# Patient Record
Sex: FEMALE | Race: WHITE | NOT HISPANIC OR LATINO | URBAN - METROPOLITAN AREA
[De-identification: names, ages, dates, MRNs, and addresses within clinical notes are randomized per-mention and may not be internally consistent; named-entity substitution may affect disease eponyms.]

---

## 2017-08-01 ENCOUNTER — OUTPATIENT (OUTPATIENT)
Dept: OUTPATIENT SERVICES | Facility: HOSPITAL | Age: 42
LOS: 1 days | End: 2017-08-01
Payer: MEDICAID

## 2017-08-02 DIAGNOSIS — R69 ILLNESS, UNSPECIFIED: ICD-10-CM

## 2017-08-11 ENCOUNTER — EMERGENCY (EMERGENCY)
Facility: HOSPITAL | Age: 42
LOS: 1 days | Discharge: HOME | End: 2017-08-11
Attending: EMERGENCY MEDICINE
Payer: MEDICAID

## 2017-08-11 VITALS
HEART RATE: 115 BPM | HEIGHT: 63 IN | DIASTOLIC BLOOD PRESSURE: 84 MMHG | RESPIRATION RATE: 18 BRPM | WEIGHT: 164.91 LBS | TEMPERATURE: 98 F | OXYGEN SATURATION: 97 % | SYSTOLIC BLOOD PRESSURE: 124 MMHG

## 2017-08-11 PROCEDURE — 99283 EMERGENCY DEPT VISIT LOW MDM: CPT

## 2017-08-11 PROCEDURE — 99283 EMERGENCY DEPT VISIT LOW MDM: CPT | Mod: 25

## 2017-08-11 RX ORDER — IBUPROFEN 200 MG
600 TABLET ORAL ONCE
Qty: 0 | Refills: 0 | Status: COMPLETED | OUTPATIENT
Start: 2017-08-11 | End: 2017-08-11

## 2017-08-11 RX ORDER — IBUPROFEN 200 MG
1 TABLET ORAL
Qty: 20 | Refills: 0 | OUTPATIENT
Start: 2017-08-11 | End: 2017-08-16

## 2017-08-11 RX ADMIN — Medication 600 MILLIGRAM(S): at 05:02

## 2017-08-11 NOTE — ED ADULT NURSE NOTE - OBJECTIVE STATEMENT
pt A&Ox4, c/o left buttock pain states had a injection at the doctors and afterward area became very painful and red

## 2017-08-11 NOTE — ED ADULT TRIAGE NOTE - CHIEF COMPLAINT QUOTE
I am on Vivtrol shot for alcohol on Tuesday. and area is painful, red and tender. to left butt cheek.

## 2017-08-16 NOTE — ED PROVIDER NOTE - PHYSICAL EXAMINATION
Vitals:   ·  /84 mm Hg·  /min· RR: (breaths/min)18 /min· Temp 98 Degrees F· SpO2 97 % RA  Left gluteal examination : No redness, No hematoma , No warmth . Area slightly firm and tender to touch.

## 2017-08-16 NOTE — ED PROVIDER NOTE - ATTENDING CONTRIBUTION TO CARE
Patient with left gluteal pain after medication injection.  Tenderness and swelling noted; no hematoma.  She was given a heat pack and pain medication and also instructed to alternate injection sites.

## 2017-08-16 NOTE — ED PROVIDER NOTE - OBJECTIVE STATEMENT
42 yr old F presented to ED for pain to left gluteal region. Pt states that she had a Vivitrol injection x 3 days ago and the area has been painful. Pt say that she gets the injection to control her Alcohol addiction. Pt denies any numbness or weakness to her lower extremity or inability to ambulate. Pt says it just hurts a lot so she want to be evaluated because this never happened before. Pt denies nay other complaints at this time.

## 2017-08-16 NOTE — ED PROVIDER NOTE - PROGRESS NOTE DETAILS
Pt treated wit Ibuprofen 600mg Po x 1 and warm compress applied to area. Pt tolerated treatment and felt a lot better. Pt D/C in stable condition and will F/U with Medical clinic as discussed.

## 2017-08-16 NOTE — ED PROVIDER NOTE - CARE PLAN
Principal Discharge DX:	Musculoskeletal pain  Instructions for follow-up, activity and diet:	Continue with warm packs as discussed and Ibuprofen as needed

## 2017-08-16 NOTE — ED PROVIDER NOTE - MEDICAL DECISION MAKING DETAILS
41 y/o F presented to ED with left gluteal muscle pain s/p IM medication x 3 days ago. Pt with no fever, chills , numbness to leg or weakness . localized pain on examination of gluteal region but no discoloration or hematoma.

## 2017-10-01 PROCEDURE — G9001: CPT

## 2017-10-30 ENCOUNTER — EMERGENCY (EMERGENCY)
Facility: HOSPITAL | Age: 42
LOS: 1 days | Discharge: OTHER INSTITUTION | End: 2017-10-30
Attending: EMERGENCY MEDICINE
Payer: MEDICAID

## 2017-10-30 VITALS
DIASTOLIC BLOOD PRESSURE: 99 MMHG | SYSTOLIC BLOOD PRESSURE: 162 MMHG | TEMPERATURE: 98 F | HEART RATE: 107 BPM | OXYGEN SATURATION: 96 % | RESPIRATION RATE: 18 BRPM

## 2017-10-30 LAB
ALBUMIN SERPL ELPH-MCNC: 4.4 G/DL — SIGNIFICANT CHANGE UP (ref 3.3–5.2)
ALP SERPL-CCNC: 104 U/L — SIGNIFICANT CHANGE UP (ref 40–120)
ALT FLD-CCNC: 84 U/L — HIGH
AMPHET UR-MCNC: NEGATIVE — SIGNIFICANT CHANGE UP
ANION GAP SERPL CALC-SCNC: 23 MMOL/L — HIGH (ref 5–17)
APPEARANCE UR: CLEAR — SIGNIFICANT CHANGE UP
AST SERPL-CCNC: 140 U/L — HIGH
BACTERIA # UR AUTO: ABNORMAL
BARBITURATES UR SCN-MCNC: NEGATIVE — SIGNIFICANT CHANGE UP
BASOPHILS # BLD AUTO: 0 K/UL — SIGNIFICANT CHANGE UP (ref 0–0.2)
BASOPHILS NFR BLD AUTO: 0.7 % — SIGNIFICANT CHANGE UP (ref 0–2)
BENZODIAZ UR-MCNC: NEGATIVE — SIGNIFICANT CHANGE UP
BILIRUB SERPL-MCNC: 0.4 MG/DL — SIGNIFICANT CHANGE UP (ref 0.4–2)
BILIRUB UR-MCNC: NEGATIVE — SIGNIFICANT CHANGE UP
BUN SERPL-MCNC: 4 MG/DL — LOW (ref 8–20)
CALCIUM SERPL-MCNC: 8.7 MG/DL — SIGNIFICANT CHANGE UP (ref 8.6–10.2)
CHLORIDE SERPL-SCNC: 95 MMOL/L — LOW (ref 98–107)
CO2 SERPL-SCNC: 21 MMOL/L — LOW (ref 22–29)
COCAINE METAB.OTHER UR-MCNC: NEGATIVE — SIGNIFICANT CHANGE UP
COLOR SPEC: ABNORMAL
CREAT SERPL-MCNC: 0.52 MG/DL — SIGNIFICANT CHANGE UP (ref 0.5–1.3)
DIFF PNL FLD: ABNORMAL
EOSINOPHIL # BLD AUTO: 0.1 K/UL — SIGNIFICANT CHANGE UP (ref 0–0.5)
EOSINOPHIL NFR BLD AUTO: 0.9 % — SIGNIFICANT CHANGE UP (ref 0–6)
EPI CELLS # UR: SIGNIFICANT CHANGE UP
ETHANOL SERPL-MCNC: 264 MG/DL — SIGNIFICANT CHANGE UP
GLUCOSE SERPL-MCNC: 87 MG/DL — SIGNIFICANT CHANGE UP (ref 70–115)
GLUCOSE UR QL: NEGATIVE MG/DL — SIGNIFICANT CHANGE UP
HCT VFR BLD CALC: 39.3 % — SIGNIFICANT CHANGE UP (ref 37–47)
HGB BLD-MCNC: 13.9 G/DL — SIGNIFICANT CHANGE UP (ref 12–16)
KETONES UR-MCNC: NEGATIVE — SIGNIFICANT CHANGE UP
LEUKOCYTE ESTERASE UR-ACNC: NEGATIVE — SIGNIFICANT CHANGE UP
LYMPHOCYTES # BLD AUTO: 1.5 K/UL — SIGNIFICANT CHANGE UP (ref 1–4.8)
LYMPHOCYTES # BLD AUTO: 22.5 % — SIGNIFICANT CHANGE UP (ref 20–55)
MCHC RBC-ENTMCNC: 34.7 PG — HIGH (ref 27–31)
MCHC RBC-ENTMCNC: 35.4 G/DL — SIGNIFICANT CHANGE UP (ref 32–36)
MCV RBC AUTO: 98 FL — SIGNIFICANT CHANGE UP (ref 81–99)
METHADONE UR-MCNC: NEGATIVE — SIGNIFICANT CHANGE UP
MONOCYTES # BLD AUTO: 0.6 K/UL — SIGNIFICANT CHANGE UP (ref 0–0.8)
MONOCYTES NFR BLD AUTO: 8.6 % — SIGNIFICANT CHANGE UP (ref 3–10)
NEUTROPHILS # BLD AUTO: 4.5 K/UL — SIGNIFICANT CHANGE UP (ref 1.8–8)
NEUTROPHILS NFR BLD AUTO: 66.9 % — SIGNIFICANT CHANGE UP (ref 37–73)
NITRITE UR-MCNC: NEGATIVE — SIGNIFICANT CHANGE UP
OPIATES UR-MCNC: NEGATIVE — SIGNIFICANT CHANGE UP
PCP SPEC-MCNC: SIGNIFICANT CHANGE UP
PCP UR-MCNC: NEGATIVE — SIGNIFICANT CHANGE UP
PH UR: 7 — SIGNIFICANT CHANGE UP (ref 5–8)
PLATELET # BLD AUTO: 100 K/UL — LOW (ref 150–400)
POTASSIUM SERPL-MCNC: 4.4 MMOL/L — SIGNIFICANT CHANGE UP (ref 3.5–5.3)
POTASSIUM SERPL-SCNC: 4.4 MMOL/L — SIGNIFICANT CHANGE UP (ref 3.5–5.3)
PROT SERPL-MCNC: 7.9 G/DL — SIGNIFICANT CHANGE UP (ref 6.6–8.7)
PROT UR-MCNC: 15 MG/DL
RBC # BLD: 4.01 M/UL — LOW (ref 4.4–5.2)
RBC # FLD: 18.3 % — HIGH (ref 11–15.6)
RBC CASTS # UR COMP ASSIST: SIGNIFICANT CHANGE UP /HPF (ref 0–4)
SODIUM SERPL-SCNC: 139 MMOL/L — SIGNIFICANT CHANGE UP (ref 135–145)
SP GR SPEC: 1 — LOW (ref 1.01–1.02)
THC UR QL: NEGATIVE — SIGNIFICANT CHANGE UP
UROBILINOGEN FLD QL: NEGATIVE MG/DL — SIGNIFICANT CHANGE UP
WBC # BLD: 6.7 K/UL — SIGNIFICANT CHANGE UP (ref 4.8–10.8)
WBC # FLD AUTO: 6.7 K/UL — SIGNIFICANT CHANGE UP (ref 4.8–10.8)
WBC UR QL: SIGNIFICANT CHANGE UP

## 2017-10-30 PROCEDURE — 99285 EMERGENCY DEPT VISIT HI MDM: CPT

## 2017-10-30 PROCEDURE — 93010 ELECTROCARDIOGRAM REPORT: CPT

## 2017-10-30 RX ORDER — ONDANSETRON 8 MG/1
8 TABLET, FILM COATED ORAL ONCE
Qty: 0 | Refills: 0 | Status: COMPLETED | OUTPATIENT
Start: 2017-10-30 | End: 2017-10-30

## 2017-10-30 RX ORDER — SODIUM CHLORIDE 9 MG/ML
1000 INJECTION INTRAMUSCULAR; INTRAVENOUS; SUBCUTANEOUS ONCE
Qty: 0 | Refills: 0 | Status: COMPLETED | OUTPATIENT
Start: 2017-10-30 | End: 2017-10-30

## 2017-10-30 RX ORDER — NICOTINE POLACRILEX 2 MG
1 GUM BUCCAL DAILY
Qty: 0 | Refills: 0 | Status: DISCONTINUED | OUTPATIENT
Start: 2017-10-30 | End: 2017-11-11

## 2017-10-30 RX ADMIN — Medication 50 MILLIGRAM(S): at 19:46

## 2017-10-30 RX ADMIN — Medication 1 MILLIGRAM(S): at 22:05

## 2017-10-30 RX ADMIN — Medication 1 PATCH: at 18:00

## 2017-10-30 RX ADMIN — Medication 50 MILLIGRAM(S): at 16:28

## 2017-10-30 RX ADMIN — ONDANSETRON 8 MILLIGRAM(S): 8 TABLET, FILM COATED ORAL at 16:30

## 2017-10-30 NOTE — ED PROVIDER NOTE - OBJECTIVE STATEMENT
43 yo male BIB EMS from [facility] for possible seizure; patient had court date for DWI, while in court, was eating during break, felt weak, lightheaded, minimal headache, then can't recall event thereafter; reportedly collapsed to floor with some small convulsions; was confused until arrived in ED; on arrival, denied any chronic alcohol or other drug use; however noted to be tremulous, and elevated LFTs. When confronted about these details, patient then admits to regular alcohol use, last drink 3 days ago; also with chronic use of xanax, has been without xanax for 6 days. [age] yo female longstanding hx of alcohol abuse and occasional cocaine use; presents with "I need detox." Patient has been drinking heavily for several days, and now wants to stop; patient states she has tried rehab before without success; no chest pain, no SOB.
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
## 2017-10-30 NOTE — ED PROVIDER NOTE - CONSTITUTIONAL, MLM
normal... non toxic, tremulous Well appearing, well nourished, awake, alert, oriented to person, place, time/situation and in no apparent distress. +anxious, +aroma of alcohol on breath

## 2017-10-30 NOTE — ED STATDOCS - PROGRESS NOTE DETAILS
41 yo F presents to ED c/o "I need detox". Pt admits to alcohol abuse and cocaine use. Last drink was today PTA. Last cocaine use 3 days ago.  Friend at bedside states pt voiced suicidal ideations today PTA which prompted ED visit. Last detox 1 month ago. Last rehab in June. Associated -30lb weight loss in 1 month and poor appetite. Adverse reaction Vivitrol. PE: Pupils are 7mm, reactive equal, round, and reactive to light. No scleral icterus. Bilateral horizontal nystagmus. White coating on surface on tongue. No cervical adenopathy. Moist mucous membranes. No organomegaly. No abdominal tenderness. No jaundice. Focused eval, protocol orders entered. Pt to be moved to main ED for complete evaluation by another provider.

## 2017-10-30 NOTE — ED PROVIDER NOTE - PSYCHIATRIC, MLM
Alert and oriented to person, place, time/situation. normal mood and affect. no apparent risk to self or others. +Etoh abuse
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
## 2017-10-30 NOTE — ED PROVIDER NOTE - PROGRESS NOTE DETAILS
SBIRT and Social Work contacted, will assess for detox bed accepted by Dr Coleman, [facility]; will transfer

## 2017-10-30 NOTE — ED ADULT NURSE NOTE - CHPI ED SYMPTOMS NEG
no confusion/no abdominal pain/no chills/no weakness/no pain/no nausea/no abdominal distension/no fever/no vomiting/no disorientation

## 2017-10-31 VITALS
DIASTOLIC BLOOD PRESSURE: 95 MMHG | RESPIRATION RATE: 18 BRPM | SYSTOLIC BLOOD PRESSURE: 136 MMHG | OXYGEN SATURATION: 98 % | TEMPERATURE: 97 F | HEART RATE: 96 BPM

## 2017-10-31 PROCEDURE — 93005 ELECTROCARDIOGRAM TRACING: CPT

## 2017-10-31 PROCEDURE — 80307 DRUG TEST PRSMV CHEM ANLYZR: CPT

## 2017-10-31 PROCEDURE — 96372 THER/PROPH/DIAG INJ SC/IM: CPT

## 2017-10-31 PROCEDURE — 36415 COLL VENOUS BLD VENIPUNCTURE: CPT

## 2017-10-31 PROCEDURE — 85027 COMPLETE CBC AUTOMATED: CPT

## 2017-10-31 PROCEDURE — 80053 COMPREHEN METABOLIC PANEL: CPT

## 2017-10-31 PROCEDURE — 81001 URINALYSIS AUTO W/SCOPE: CPT

## 2017-10-31 PROCEDURE — 99285 EMERGENCY DEPT VISIT HI MDM: CPT | Mod: 25

## 2017-10-31 RX ORDER — ONDANSETRON 8 MG/1
4 TABLET, FILM COATED ORAL ONCE
Qty: 0 | Refills: 0 | Status: COMPLETED | OUTPATIENT
Start: 2017-10-31 | End: 2017-10-31

## 2017-10-31 RX ORDER — ONDANSETRON 8 MG/1
4 TABLET, FILM COATED ORAL ONCE
Qty: 0 | Refills: 0 | Status: DISCONTINUED | OUTPATIENT
Start: 2017-10-31 | End: 2017-10-31

## 2017-10-31 RX ORDER — NICOTINE POLACRILEX 2 MG
1 GUM BUCCAL DAILY
Qty: 0 | Refills: 0 | Status: DISCONTINUED | OUTPATIENT
Start: 2017-10-31 | End: 2017-10-31

## 2017-10-31 RX ADMIN — Medication 1 MILLIGRAM(S): at 01:31

## 2017-10-31 RX ADMIN — Medication 1 PATCH: at 08:39

## 2017-10-31 RX ADMIN — ONDANSETRON 4 MILLIGRAM(S): 8 TABLET, FILM COATED ORAL at 00:51

## 2017-10-31 RX ADMIN — Medication 100 MILLIGRAM(S): at 06:51

## 2017-10-31 RX ADMIN — Medication 2 MILLIGRAM(S): at 10:40

## 2017-10-31 NOTE — SBIRT NOTE. - NSSBIRTSERVICES_GEN_A_ED_FT
Naloxone Rescue Kit dispensed: Pt was educated about Naloxone and trained on how to assemble and utilize the kit.[id number] and [id number] to friend  Provided SBIRT services: Full screen positive. Referral to Treatment Performed. Screening results were reviewed with the patient and patient was provided information about healthy guidelines and potential negative consequences associated with level of risk. Motivation and readiness to reduce or stop use was discussed and goals and activities to make changes were suggested/offered.  Referral for complete assessment and level of care determination at a certified treatment facility was completed by contacting the treatment facility via phone, and add apt info as noted below:  Appt confirmed at [facility] - Doc to doc - Nurse to nurse - Peter via   Audit Score: 35  DAST Score: 2  Duration = 50 Minutes

## 2017-10-31 NOTE — ED ADULT NURSE REASSESSMENT NOTE - TREMOR
4=moderate with patient's arms extended
0=no tremor

## 2017-10-31 NOTE — CHART NOTE - NSCHARTNOTEFT_GEN_A_CORE
SOCIAL WORK NOTE:  DISCUSSED CASE WITH JYOTHI FROM SBIRT.  MULTIPLE PHONE SCREENINGS COMPLETED AND BED AVAILABLE AT [facility]. DR RIVERO CALLED ACCEPTING MD, DR AHMADI.  PATIENT ACCEPTED TO [facility] TODAY. CONFIRMED WITH DR RIVERO. AMBULANCE ARRANGED FOR 12:30 -1:00 PM PICKUP AND TRANSFER FOR TODAY.

## 2017-10-31 NOTE — ED ADULT NURSE REASSESSMENT NOTE - NS ED NURSE REASSESS COMMENT FT1
nurse to nurse report given to KAYCE godinez
Pt resting comfortable in NAD resp even and unlabored 1:1 in place will continue to monitor.
Assumed pt care @ 1915 from ADRIENNE Heller. Pt is A&Ox3 in NAD. Pt in yellow gown with belongings off the stretcher with 1:1 in place. Pt denies any pain. CIWA 5.  Safety maintained, Bed locked in lowest position. Will continue to monitor.
Dr. Sherman made aware of the pts CIWA. Will continue to monitor.
Pt aware she is waiting for SW this morning. Librium administered.
patient aox4 comfortable in appearance. respirations clear equal and unlabored. heart sounds s1 s2  WDL, abdomen soft nontender + bowel sounds all 4 quadrants, moves all extremities. + pulse all 4 extremities. + sensation all 4 extremities. patient and family updated on plan of care. patient and family educated on hourly rounding procedures and understands call bell system. 1:1 shola at bedside.

## 2017-10-31 NOTE — ED ADULT NURSE REASSESSMENT NOTE - NAUSEA/VOMITING
0=no nausea with no vomiting
0=no nausea with no vomiting
1=mild nausea with no vomiting
0=no nausea with no vomiting

## 2017-11-05 ENCOUNTER — EMERGENCY (EMERGENCY)
Facility: HOSPITAL | Age: 42
LOS: 1 days | Discharge: HOME | End: 2017-11-05
Attending: EMERGENCY MEDICINE | Admitting: STUDENT IN AN ORGANIZED HEALTH CARE EDUCATION/TRAINING PROGRAM
Payer: MEDICAID

## 2017-11-05 VITALS
OXYGEN SATURATION: 97 % | TEMPERATURE: 98 F | RESPIRATION RATE: 16 BRPM | DIASTOLIC BLOOD PRESSURE: 76 MMHG | HEART RATE: 91 BPM | WEIGHT: 139.99 LBS | SYSTOLIC BLOOD PRESSURE: 106 MMHG | HEIGHT: 63 IN

## 2017-11-05 LAB
ALBUMIN SERPL ELPH-MCNC: 4.2 G/DL — SIGNIFICANT CHANGE UP (ref 3.3–5.2)
ALP SERPL-CCNC: 82 U/L — SIGNIFICANT CHANGE UP (ref 40–120)
ALT FLD-CCNC: 168 U/L — HIGH
AMPHET UR-MCNC: NEGATIVE — SIGNIFICANT CHANGE UP
ANION GAP SERPL CALC-SCNC: 14 MMOL/L — SIGNIFICANT CHANGE UP (ref 5–17)
APAP SERPL-MCNC: <7.5 UG/ML — LOW (ref 10–26)
APPEARANCE UR: CLEAR — SIGNIFICANT CHANGE UP
AST SERPL-CCNC: 156 U/L — HIGH
BARBITURATES UR SCN-MCNC: NEGATIVE — SIGNIFICANT CHANGE UP
BASOPHILS # BLD AUTO: 0.1 K/UL — SIGNIFICANT CHANGE UP (ref 0–0.2)
BASOPHILS NFR BLD AUTO: 1.1 % — SIGNIFICANT CHANGE UP (ref 0–2)
BENZODIAZ UR-MCNC: NEGATIVE — SIGNIFICANT CHANGE UP
BILIRUB SERPL-MCNC: 0.2 MG/DL — LOW (ref 0.4–2)
BILIRUB UR-MCNC: NEGATIVE — SIGNIFICANT CHANGE UP
BUN SERPL-MCNC: 4 MG/DL — LOW (ref 8–20)
CALCIUM SERPL-MCNC: 9.1 MG/DL — SIGNIFICANT CHANGE UP (ref 8.6–10.2)
CHLORIDE SERPL-SCNC: 105 MMOL/L — SIGNIFICANT CHANGE UP (ref 98–107)
CO2 SERPL-SCNC: 23 MMOL/L — SIGNIFICANT CHANGE UP (ref 22–29)
COCAINE METAB.OTHER UR-MCNC: NEGATIVE — SIGNIFICANT CHANGE UP
COLOR SPEC: YELLOW — SIGNIFICANT CHANGE UP
CREAT SERPL-MCNC: 0.54 MG/DL — SIGNIFICANT CHANGE UP (ref 0.5–1.3)
DIFF PNL FLD: NEGATIVE — SIGNIFICANT CHANGE UP
EOSINOPHIL # BLD AUTO: 0.1 K/UL — SIGNIFICANT CHANGE UP (ref 0–0.5)
EOSINOPHIL NFR BLD AUTO: 1.9 % — SIGNIFICANT CHANGE UP (ref 0–6)
ETHANOL SERPL-MCNC: 119 MG/DL — SIGNIFICANT CHANGE UP
GLUCOSE SERPL-MCNC: 95 MG/DL — SIGNIFICANT CHANGE UP (ref 70–115)
GLUCOSE UR QL: NEGATIVE MG/DL — SIGNIFICANT CHANGE UP
HCT VFR BLD CALC: 38.7 % — SIGNIFICANT CHANGE UP (ref 37–47)
HGB BLD-MCNC: 13.4 G/DL — SIGNIFICANT CHANGE UP (ref 12–16)
KETONES UR-MCNC: NEGATIVE — SIGNIFICANT CHANGE UP
LEUKOCYTE ESTERASE UR-ACNC: NEGATIVE — SIGNIFICANT CHANGE UP
LYMPHOCYTES # BLD AUTO: 2.1 K/UL — SIGNIFICANT CHANGE UP (ref 1–4.8)
LYMPHOCYTES # BLD AUTO: 27.7 % — SIGNIFICANT CHANGE UP (ref 20–55)
MCHC RBC-ENTMCNC: 34.6 G/DL — SIGNIFICANT CHANGE UP (ref 32–36)
MCHC RBC-ENTMCNC: 35 PG — HIGH (ref 27–31)
MCV RBC AUTO: 101 FL — HIGH (ref 81–99)
METHADONE UR-MCNC: NEGATIVE — SIGNIFICANT CHANGE UP
MONOCYTES # BLD AUTO: 0.9 K/UL — HIGH (ref 0–0.8)
MONOCYTES NFR BLD AUTO: 12.6 % — HIGH (ref 3–10)
NEUTROPHILS # BLD AUTO: 4.2 K/UL — SIGNIFICANT CHANGE UP (ref 1.8–8)
NEUTROPHILS NFR BLD AUTO: 56.4 % — SIGNIFICANT CHANGE UP (ref 37–73)
NITRITE UR-MCNC: NEGATIVE — SIGNIFICANT CHANGE UP
OPIATES UR-MCNC: NEGATIVE — SIGNIFICANT CHANGE UP
PCP SPEC-MCNC: SIGNIFICANT CHANGE UP
PCP UR-MCNC: NEGATIVE — SIGNIFICANT CHANGE UP
PH UR: 6 — SIGNIFICANT CHANGE UP (ref 5–8)
PLATELET # BLD AUTO: 97 K/UL — LOW (ref 150–400)
POTASSIUM SERPL-MCNC: 3.8 MMOL/L — SIGNIFICANT CHANGE UP (ref 3.5–5.3)
POTASSIUM SERPL-SCNC: 3.8 MMOL/L — SIGNIFICANT CHANGE UP (ref 3.5–5.3)
PROT SERPL-MCNC: 7.4 G/DL — SIGNIFICANT CHANGE UP (ref 6.6–8.7)
PROT UR-MCNC: NEGATIVE MG/DL — SIGNIFICANT CHANGE UP
RBC # BLD: 3.83 M/UL — LOW (ref 4.4–5.2)
RBC # FLD: 17 % — HIGH (ref 11–15.6)
SALICYLATES SERPL-MCNC: <2 MG/DL — LOW (ref 10–20)
SODIUM SERPL-SCNC: 142 MMOL/L — SIGNIFICANT CHANGE UP (ref 135–145)
SP GR SPEC: 1.01 — SIGNIFICANT CHANGE UP (ref 1.01–1.02)
THC UR QL: NEGATIVE — SIGNIFICANT CHANGE UP
UROBILINOGEN FLD QL: NEGATIVE MG/DL — SIGNIFICANT CHANGE UP
WBC # BLD: 7.4 K/UL — SIGNIFICANT CHANGE UP (ref 4.8–10.8)
WBC # FLD AUTO: 7.4 K/UL — SIGNIFICANT CHANGE UP (ref 4.8–10.8)

## 2017-11-05 PROCEDURE — 99284 EMERGENCY DEPT VISIT MOD MDM: CPT

## 2017-11-05 RX ORDER — NICOTINE POLACRILEX 2 MG
1 GUM BUCCAL DAILY
Qty: 0 | Refills: 0 | Status: DISCONTINUED | OUTPATIENT
Start: 2017-11-05 | End: 2017-11-11

## 2017-11-05 RX ADMIN — Medication 50 MILLIGRAM(S): at 22:57

## 2017-11-05 RX ADMIN — Medication 1 PATCH: at 22:57

## 2017-11-05 NOTE — ED PROVIDER NOTE - MEDICAL DECISION MAKING DETAILS
Pt to be observed over night for acute alc detox, will hae SBIRT/ social work to speak to pt in the morning. Pt seen by SBIRT and offered spot in [facility] for detox and then [facility]. pt refuses placement in both locations. Pt states she feels well and would like to go, feels safe to go home, and has intake appt at detox 11/8. Pt with steady gait, no tremors, no sign of intoxication or withdrawal. Pt well apeparing and stable for dc

## 2017-11-05 NOTE — ED ADULT NURSE NOTE - OBJECTIVE STATEMENT
patient alert and oriented x4 MAEx4, states that she is drinking and it has to stop, patient seeking detox, patient states not leaving until she gets help, respirations even and unlabored no distress noted no s&s of withdrals at this time

## 2017-11-05 NOTE — ED ADULT TRIAGE NOTE - CHIEF COMPLAINT QUOTE
pt states was drinking about 15 min ago and wants to be detoxed medically and attend rehab on Wednesday, vss no visible signs of alcohol withdrawal

## 2017-11-05 NOTE — ED ADULT NURSE NOTE - CHPI ED SYMPTOMS NEG
no disorientation/no weakness/no fever/no abdominal distension/no vomiting/no pain/no abdominal pain/no nausea/no confusion/no chills

## 2017-11-05 NOTE — ED PROVIDER NOTE - SKIN, MLM
2 bruises to L lateral thigh and L lower elg secondary to recent fall at a rehab. Denies head trauma or AMS.

## 2017-11-05 NOTE — ED PROVIDER NOTE - OBJECTIVE STATEMENT
This is a 41 y/o F presents to ED c/o alcohol intoxication and requesting detox. Pt reports she is an alcoholic who was previously at a rehab where she had a bad experience and left. Has been drinking beer all day today and states she "is not drunk but is buzzed." Last normal meal was prior to coming to ED. Pt is requesting to be kept into ED until she gets into her detox program on Wednesday. Denies N/V/D, fever, chills, SOB, CP, difficulty breathing, HA, numbness, tingling and abd pain.   PCP: Dr. Duarte

## 2017-11-06 VITALS
HEART RATE: 89 BPM | DIASTOLIC BLOOD PRESSURE: 78 MMHG | OXYGEN SATURATION: 98 % | TEMPERATURE: 97 F | RESPIRATION RATE: 16 BRPM | SYSTOLIC BLOOD PRESSURE: 113 MMHG

## 2017-11-06 PROCEDURE — 36415 COLL VENOUS BLD VENIPUNCTURE: CPT

## 2017-11-06 PROCEDURE — 81003 URINALYSIS AUTO W/O SCOPE: CPT

## 2017-11-06 PROCEDURE — 99285 EMERGENCY DEPT VISIT HI MDM: CPT

## 2017-11-06 PROCEDURE — 80307 DRUG TEST PRSMV CHEM ANLYZR: CPT

## 2017-11-06 PROCEDURE — 80053 COMPREHEN METABOLIC PANEL: CPT

## 2017-11-06 PROCEDURE — 85027 COMPLETE CBC AUTOMATED: CPT

## 2017-11-06 RX ORDER — DIPHENHYDRAMINE HCL 50 MG
50 CAPSULE ORAL ONCE
Qty: 0 | Refills: 0 | Status: COMPLETED | OUTPATIENT
Start: 2017-11-06 | End: 2017-11-06

## 2017-11-06 RX ADMIN — Medication 50 MILLIGRAM(S): at 07:31

## 2017-11-06 RX ADMIN — Medication 1 MILLIGRAM(S): at 01:54

## 2017-11-06 RX ADMIN — Medication 50 MILLIGRAM(S): at 01:54

## 2017-11-06 NOTE — SBIRT NOTE. - NSSBIRTSERVICES_GEN_A_ED_FT
Provided SBIRT services: Full screen positive. Referral to Treatment Performed. Screening results were reviewed with the patient and patient was provided information about healthy guidelines and potential negative consequences associated with level of risk. Motivation and readiness to reduce or stop use was discussed and goals and activities to make changes were suggested/offered.  Referral for complete assessment and level of care determination at a certified treatment facility was completed by giving the patient information for treatment facilities that met their needs and encouraging them to call for an appointment. A call was not made to a facility because  Patient currently has a treatment plan setup or currently in treatment- pt [facility]  Audit Score: 37  DAST Score: 2  Duration = 20 Minutes

## 2017-11-06 NOTE — ED ADULT NURSE REASSESSMENT NOTE - NS ED NURSE REASSESS COMMENT FT1
assumed pt care this am. pt resting in bed await SW assistance.
patient moved to room A9 no distress noted awaiting consult in am and SW

## 2017-11-09 LAB
AMPHETAMINES BLD QL SCN: NEGATIVE — SIGNIFICANT CHANGE UP
BARBITURATES SERPLBLD QL: NEGATIVE — SIGNIFICANT CHANGE UP
BENZODIAZAPINES, SERUM: POSITIVE
CANNABINOIDS SERPLBLD QL SCN: NEGATIVE — SIGNIFICANT CHANGE UP
COCAINE+BZE SERPLBLD QL SCN: NEGATIVE — SIGNIFICANT CHANGE UP
METHADONE SERPL-MCNC: NEGATIVE — SIGNIFICANT CHANGE UP
OPIATES SERPL QL: NEGATIVE — SIGNIFICANT CHANGE UP
PCP BLD QL SCN: NEGATIVE — SIGNIFICANT CHANGE UP

## 2017-11-11 NOTE — ED PROVIDER NOTE - PSH
Daughter Debra called. [facility] is nearly out of Tramadol. Would like it refilled.  I asked to have the on call for Dr Yu paged to call Debra at [phone]. Herb Mcfarland was paged.  Rossana QUAN RN [facility]     
No significant past surgical history

## 2017-12-19 ENCOUNTER — EMERGENCY (EMERGENCY)
Facility: HOSPITAL | Age: 42
LOS: 1 days | End: 2017-12-19
Payer: MEDICAID

## 2017-12-19 PROCEDURE — 99284 EMERGENCY DEPT VISIT MOD MDM: CPT

## 2017-12-19 PROCEDURE — 71020: CPT | Mod: 26

## 2018-02-08 ENCOUNTER — EMERGENCY (EMERGENCY)
Facility: HOSPITAL | Age: 43
LOS: 1 days | End: 2018-02-08
Payer: MEDICAID

## 2018-02-08 PROCEDURE — 99283 EMERGENCY DEPT VISIT LOW MDM: CPT

## 2018-02-14 ENCOUNTER — RESULT REVIEW (OUTPATIENT)
Age: 43
End: 2018-02-14

## 2018-02-14 ENCOUNTER — OUTPATIENT (OUTPATIENT)
Dept: OUTPATIENT SERVICES | Facility: HOSPITAL | Age: 43
LOS: 1 days | End: 2018-02-14
Payer: MEDICAID

## 2018-02-14 ENCOUNTER — APPOINTMENT (OUTPATIENT)
Dept: MAMMOGRAPHY | Facility: CLINIC | Age: 43
End: 2018-02-14
Payer: MEDICAID

## 2018-02-14 DIAGNOSIS — R92.8 OTHER ABNORMAL AND INCONCLUSIVE FINDINGS ON DIAGNOSTIC IMAGING OF BREAST: ICD-10-CM

## 2018-02-14 PROCEDURE — 19081 BX BREAST 1ST LESION STRTCTC: CPT

## 2018-02-14 PROCEDURE — A4648: CPT

## 2018-02-14 PROCEDURE — 19081 BX BREAST 1ST LESION STRTCTC: CPT | Mod: LT

## 2018-02-14 PROCEDURE — 77065 DX MAMMO INCL CAD UNI: CPT | Mod: 26,LT

## 2018-02-14 PROCEDURE — 77065 DX MAMMO INCL CAD UNI: CPT

## 2018-03-16 ENCOUNTER — EMERGENCY (EMERGENCY)
Facility: HOSPITAL | Age: 43
LOS: 1 days | End: 2018-03-16
Payer: MEDICAID

## 2018-03-16 PROCEDURE — 99284 EMERGENCY DEPT VISIT MOD MDM: CPT

## 2018-06-11 ENCOUNTER — EMERGENCY (EMERGENCY)
Facility: HOSPITAL | Age: 43
LOS: 1 days | Discharge: LEFT BEFORE DISCHARGE | End: 2018-06-11
Attending: EMERGENCY MEDICINE
Payer: MEDICAID

## 2018-06-11 ENCOUNTER — EMERGENCY (EMERGENCY)
Facility: HOSPITAL | Age: 43
LOS: 1 days | Discharge: HOME | End: 2018-06-11
Attending: STUDENT IN AN ORGANIZED HEALTH CARE EDUCATION/TRAINING PROGRAM
Payer: MEDICAID

## 2018-06-11 VITALS — HEIGHT: 63 IN | WEIGHT: 164.91 LBS

## 2018-06-11 VITALS — WEIGHT: 164.02 LBS | HEIGHT: 63 IN

## 2018-06-11 VITALS
HEART RATE: 69 BPM | SYSTOLIC BLOOD PRESSURE: 132 MMHG | RESPIRATION RATE: 18 BRPM | OXYGEN SATURATION: 100 % | DIASTOLIC BLOOD PRESSURE: 84 MMHG | TEMPERATURE: 98 F

## 2018-06-11 LAB
ALBUMIN SERPL ELPH-MCNC: 4.2 G/DL — SIGNIFICANT CHANGE UP (ref 3.3–5.2)
ALP SERPL-CCNC: 111 U/L — SIGNIFICANT CHANGE UP (ref 40–120)
ALT FLD-CCNC: 39 U/L — HIGH
AMPHET UR-MCNC: NEGATIVE — SIGNIFICANT CHANGE UP
ANION GAP SERPL CALC-SCNC: 22 MMOL/L — HIGH (ref 5–17)
APPEARANCE UR: ABNORMAL
AST SERPL-CCNC: 50 U/L — HIGH
BACTERIA # UR AUTO: ABNORMAL
BARBITURATES UR SCN-MCNC: NEGATIVE — SIGNIFICANT CHANGE UP
BENZODIAZ UR-MCNC: NEGATIVE — SIGNIFICANT CHANGE UP
BILIRUB SERPL-MCNC: 0.3 MG/DL — LOW (ref 0.4–2)
BILIRUB UR-MCNC: NEGATIVE — SIGNIFICANT CHANGE UP
BUN SERPL-MCNC: 7 MG/DL — LOW (ref 8–20)
CALCIUM SERPL-MCNC: 8.8 MG/DL — SIGNIFICANT CHANGE UP (ref 8.6–10.2)
CHLORIDE SERPL-SCNC: 98 MMOL/L — SIGNIFICANT CHANGE UP (ref 98–107)
CO2 SERPL-SCNC: 19 MMOL/L — LOW (ref 22–29)
COCAINE METAB.OTHER UR-MCNC: NEGATIVE — SIGNIFICANT CHANGE UP
COLOR SPEC: YELLOW — SIGNIFICANT CHANGE UP
CREAT SERPL-MCNC: 0.55 MG/DL — SIGNIFICANT CHANGE UP (ref 0.5–1.3)
DIFF PNL FLD: ABNORMAL
EPI CELLS # UR: SIGNIFICANT CHANGE UP
GLUCOSE SERPL-MCNC: 85 MG/DL — SIGNIFICANT CHANGE UP (ref 70–115)
GLUCOSE UR QL: NEGATIVE MG/DL — SIGNIFICANT CHANGE UP
HCT VFR BLD CALC: 40.7 % — SIGNIFICANT CHANGE UP (ref 37–47)
HGB BLD-MCNC: 14.4 G/DL — SIGNIFICANT CHANGE UP (ref 12–16)
KETONES UR-MCNC: ABNORMAL
LEUKOCYTE ESTERASE UR-ACNC: ABNORMAL
MCHC RBC-ENTMCNC: 31.9 PG — HIGH (ref 27–31)
MCHC RBC-ENTMCNC: 35.4 G/DL — SIGNIFICANT CHANGE UP (ref 32–36)
MCV RBC AUTO: 90 FL — SIGNIFICANT CHANGE UP (ref 81–99)
METHADONE UR-MCNC: NEGATIVE — SIGNIFICANT CHANGE UP
NITRITE UR-MCNC: NEGATIVE — SIGNIFICANT CHANGE UP
OPIATES UR-MCNC: NEGATIVE — SIGNIFICANT CHANGE UP
PCP SPEC-MCNC: SIGNIFICANT CHANGE UP
PCP UR-MCNC: NEGATIVE — SIGNIFICANT CHANGE UP
PH UR: 5 — SIGNIFICANT CHANGE UP (ref 5–8)
PLATELET # BLD AUTO: 99 K/UL — LOW (ref 150–400)
POTASSIUM SERPL-MCNC: 3.8 MMOL/L — SIGNIFICANT CHANGE UP (ref 3.5–5.3)
POTASSIUM SERPL-SCNC: 3.8 MMOL/L — SIGNIFICANT CHANGE UP (ref 3.5–5.3)
PROT SERPL-MCNC: 7.5 G/DL — SIGNIFICANT CHANGE UP (ref 6.6–8.7)
PROT UR-MCNC: 15 MG/DL
RBC # BLD: 4.52 M/UL — SIGNIFICANT CHANGE UP (ref 4.4–5.2)
RBC # FLD: 15.2 % — SIGNIFICANT CHANGE UP (ref 11–15.6)
RBC CASTS # UR COMP ASSIST: SIGNIFICANT CHANGE UP /HPF (ref 0–4)
SODIUM SERPL-SCNC: 139 MMOL/L — SIGNIFICANT CHANGE UP (ref 135–145)
SP GR SPEC: 1 — LOW (ref 1.01–1.02)
THC UR QL: NEGATIVE — SIGNIFICANT CHANGE UP
UROBILINOGEN FLD QL: NEGATIVE MG/DL — SIGNIFICANT CHANGE UP
WBC # BLD: 12.4 K/UL — HIGH (ref 4.8–10.8)
WBC # FLD AUTO: 12.4 K/UL — HIGH (ref 4.8–10.8)
WBC UR QL: SIGNIFICANT CHANGE UP

## 2018-06-11 PROCEDURE — 99284 EMERGENCY DEPT VISIT MOD MDM: CPT | Mod: 25

## 2018-06-11 PROCEDURE — 99284 EMERGENCY DEPT VISIT MOD MDM: CPT

## 2018-06-11 RX ORDER — SODIUM CHLORIDE 9 MG/ML
1000 INJECTION INTRAMUSCULAR; INTRAVENOUS; SUBCUTANEOUS
Qty: 0 | Refills: 0 | Status: DISCONTINUED | OUTPATIENT
Start: 2018-06-11 | End: 2018-06-16

## 2018-06-11 RX ORDER — FLUCONAZOLE 150 MG/1
150 TABLET ORAL ONCE
Qty: 0 | Refills: 0 | Status: COMPLETED | OUTPATIENT
Start: 2018-06-11 | End: 2018-06-11

## 2018-06-11 RX ORDER — CEFTRIAXONE 500 MG/1
250 INJECTION, POWDER, FOR SOLUTION INTRAMUSCULAR; INTRAVENOUS ONCE
Qty: 0 | Refills: 0 | Status: DISCONTINUED | OUTPATIENT
Start: 2018-06-11 | End: 2018-06-16

## 2018-06-11 RX ORDER — CEFAZOLIN SODIUM 1 G
1000 VIAL (EA) INJECTION ONCE
Qty: 0 | Refills: 0 | Status: COMPLETED | OUTPATIENT
Start: 2018-06-11 | End: 2018-06-11

## 2018-06-11 RX ORDER — AZITHROMYCIN 500 MG/1
1000 TABLET, FILM COATED ORAL ONCE
Qty: 0 | Refills: 0 | Status: DISCONTINUED | OUTPATIENT
Start: 2018-06-11 | End: 2018-06-16

## 2018-06-11 RX ORDER — SODIUM CHLORIDE 9 MG/ML
1000 INJECTION INTRAMUSCULAR; INTRAVENOUS; SUBCUTANEOUS ONCE
Qty: 0 | Refills: 0 | Status: COMPLETED | OUTPATIENT
Start: 2018-06-11 | End: 2018-06-11

## 2018-06-11 RX ORDER — SODIUM CHLORIDE 9 MG/ML
3 INJECTION INTRAMUSCULAR; INTRAVENOUS; SUBCUTANEOUS ONCE
Qty: 0 | Refills: 0 | Status: COMPLETED | OUTPATIENT
Start: 2018-06-11 | End: 2018-06-11

## 2018-06-11 RX ADMIN — Medication 100 MILLIGRAM(S): at 18:55

## 2018-06-11 RX ADMIN — Medication 50 MILLIGRAM(S): at 18:55

## 2018-06-11 RX ADMIN — SODIUM CHLORIDE 3 MILLILITER(S): 9 INJECTION INTRAMUSCULAR; INTRAVENOUS; SUBCUTANEOUS at 18:50

## 2018-06-11 RX ADMIN — FLUCONAZOLE 150 MILLIGRAM(S): 150 TABLET ORAL at 18:55

## 2018-06-11 RX ADMIN — SODIUM CHLORIDE 1000 MILLILITER(S): 9 INJECTION INTRAMUSCULAR; INTRAVENOUS; SUBCUTANEOUS at 18:50

## 2018-06-11 NOTE — ED PROVIDER NOTE - MEDICAL DECISION MAKING DETAILS
PT WITH MUTLIPLE ISSUES. ALCOHOL ABUSE, UNPROTECTED SEX, SKIN INFECTION  AREA, AND YEAST INFECTION. HYDRATIG WITH IVF, MEDS ORDERED, OBSERVING FOR SIGNS ETOH WITHDRAWAL

## 2018-06-11 NOTE — ED PROVIDER NOTE - OBJECTIVE STATEMENT
42 YO FEMALE PRESENTS  FOR DETOX. STATES SHE LOST HER SON AND STARTED DRINKING. SHE HAS ABUSED ALCOHOL IN THE PAST AND DETOXED WITH LIBRIUM. SHE HAS NOT HAD ALCOHOL WITHDRAWAL SEIZURE. LAST DRINK ONE HOUR PRIOR  TO ARRIVAL. PT ALSO C/O HAVING AN ABSCESS IN HER INGUINAL AREA AND A YEAST INFECTION. PT STATES SHE FEELS DEHYDRATED. CAME TO ER FOR EVALUATION, AND TO MAKE SURE SHE DOESN'T GO INTO WITHDRAWAL.   NO ALLERGIES  MED HX ALCOHOL ABUSE

## 2018-06-11 NOTE — ED ADULT NURSE NOTE - OBJECTIVE STATEMENT
Pt states she feels like she is having alcohol withdrawals, last drink was before coming into ED. Denies SI/HI

## 2018-06-11 NOTE — ED PROVIDER NOTE - PROGRESS NOTE DETAILS
PT TOLD RN THAT SHE HAD UNPROTECTED SEX. WANTS STD TESTING AND HIV. TESTS ORDERED AND STD TREATMENT ORDERED.

## 2018-06-12 VITALS
TEMPERATURE: 98 F | RESPIRATION RATE: 18 BRPM | SYSTOLIC BLOOD PRESSURE: 118 MMHG | OXYGEN SATURATION: 100 % | DIASTOLIC BLOOD PRESSURE: 80 MMHG | HEART RATE: 76 BPM

## 2018-06-12 LAB
ALBUMIN SERPL ELPH-MCNC: 4.3 G/DL — SIGNIFICANT CHANGE UP (ref 3.3–5.2)
ALP SERPL-CCNC: 113 U/L — SIGNIFICANT CHANGE UP (ref 40–120)
ALT FLD-CCNC: 38 U/L — HIGH
ANION GAP SERPL CALC-SCNC: 19 MMOL/L — HIGH (ref 5–17)
APAP SERPL-MCNC: <7.5 UG/ML — LOW (ref 10–26)
APPEARANCE UR: CLEAR — SIGNIFICANT CHANGE UP
AST SERPL-CCNC: 47 U/L — HIGH
BACTERIA # UR AUTO: ABNORMAL
BASOPHILS # BLD AUTO: 0 K/UL — SIGNIFICANT CHANGE UP (ref 0–0.2)
BASOPHILS NFR BLD AUTO: 0.3 % — SIGNIFICANT CHANGE UP (ref 0–2)
BILIRUB SERPL-MCNC: 0.2 MG/DL — LOW (ref 0.4–2)
BILIRUB UR-MCNC: NEGATIVE — SIGNIFICANT CHANGE UP
BUN SERPL-MCNC: 6 MG/DL — LOW (ref 8–20)
CALCIUM SERPL-MCNC: 8.9 MG/DL — SIGNIFICANT CHANGE UP (ref 8.6–10.2)
CHLORIDE SERPL-SCNC: 100 MMOL/L — SIGNIFICANT CHANGE UP (ref 98–107)
CO2 SERPL-SCNC: 21 MMOL/L — LOW (ref 22–29)
COLOR SPEC: YELLOW — SIGNIFICANT CHANGE UP
CREAT SERPL-MCNC: 0.57 MG/DL — SIGNIFICANT CHANGE UP (ref 0.5–1.3)
DIFF PNL FLD: ABNORMAL
EOSINOPHIL # BLD AUTO: 0.2 K/UL — SIGNIFICANT CHANGE UP (ref 0–0.5)
EOSINOPHIL NFR BLD AUTO: 1.6 % — SIGNIFICANT CHANGE UP (ref 0–6)
EPI CELLS # UR: ABNORMAL
ETHANOL SERPL-MCNC: 124 MG/DL — SIGNIFICANT CHANGE UP
ETHANOL SERPL-MCNC: 241 MG/DL — SIGNIFICANT CHANGE UP
GLUCOSE SERPL-MCNC: 95 MG/DL — SIGNIFICANT CHANGE UP (ref 70–115)
GLUCOSE UR QL: NEGATIVE MG/DL — SIGNIFICANT CHANGE UP
HCG SERPL-ACNC: <5 MIU/ML — SIGNIFICANT CHANGE UP
HCT VFR BLD CALC: 41.7 % — SIGNIFICANT CHANGE UP (ref 37–47)
HGB BLD-MCNC: 14.2 G/DL — SIGNIFICANT CHANGE UP (ref 12–16)
HIV 1 & 2 AB SERPL IA.RAPID: SIGNIFICANT CHANGE UP
KETONES UR-MCNC: NEGATIVE — SIGNIFICANT CHANGE UP
LEUKOCYTE ESTERASE UR-ACNC: ABNORMAL
LYMPHOCYTES # BLD AUTO: 29.7 % — SIGNIFICANT CHANGE UP (ref 20–55)
LYMPHOCYTES # BLD AUTO: 3.4 K/UL — SIGNIFICANT CHANGE UP (ref 1–4.8)
MCHC RBC-ENTMCNC: 31.4 PG — HIGH (ref 27–31)
MCHC RBC-ENTMCNC: 34.1 G/DL — SIGNIFICANT CHANGE UP (ref 32–36)
MCV RBC AUTO: 92.3 FL — SIGNIFICANT CHANGE UP (ref 81–99)
MONOCYTES # BLD AUTO: 0.7 K/UL — SIGNIFICANT CHANGE UP (ref 0–0.8)
MONOCYTES NFR BLD AUTO: 5.9 % — SIGNIFICANT CHANGE UP (ref 3–10)
NEUTROPHILS # BLD AUTO: 7.2 K/UL — SIGNIFICANT CHANGE UP (ref 1.8–8)
NEUTROPHILS NFR BLD AUTO: 62.2 % — SIGNIFICANT CHANGE UP (ref 37–73)
NITRITE UR-MCNC: NEGATIVE — SIGNIFICANT CHANGE UP
PH UR: 6.5 — SIGNIFICANT CHANGE UP (ref 5–8)
PLATELET # BLD AUTO: 99 K/UL — LOW (ref 150–400)
POTASSIUM SERPL-MCNC: 3.9 MMOL/L — SIGNIFICANT CHANGE UP (ref 3.5–5.3)
POTASSIUM SERPL-SCNC: 3.9 MMOL/L — SIGNIFICANT CHANGE UP (ref 3.5–5.3)
PROT SERPL-MCNC: 8 G/DL — SIGNIFICANT CHANGE UP (ref 6.6–8.7)
PROT UR-MCNC: 30 MG/DL
RBC # BLD: 4.52 M/UL — SIGNIFICANT CHANGE UP (ref 4.4–5.2)
RBC # FLD: 15.1 % — SIGNIFICANT CHANGE UP (ref 11–15.6)
RBC CASTS # UR COMP ASSIST: ABNORMAL /HPF (ref 0–4)
SALICYLATES SERPL-MCNC: <0.6 MG/DL — LOW (ref 10–20)
SODIUM SERPL-SCNC: 140 MMOL/L — SIGNIFICANT CHANGE UP (ref 135–145)
SP GR SPEC: 1 — LOW (ref 1.01–1.02)
TSH SERPL-MCNC: 1.34 UIU/ML — SIGNIFICANT CHANGE UP (ref 0.27–4.2)
UROBILINOGEN FLD QL: NEGATIVE MG/DL — SIGNIFICANT CHANGE UP
WBC # BLD: 11.6 K/UL — HIGH (ref 4.8–10.8)
WBC # FLD AUTO: 11.6 K/UL — HIGH (ref 4.8–10.8)
WBC UR QL: ABNORMAL

## 2018-06-12 PROCEDURE — 86703 HIV-1/HIV-2 1 RESULT ANTBDY: CPT

## 2018-06-12 PROCEDURE — 80307 DRUG TEST PRSMV CHEM ANLYZR: CPT

## 2018-06-12 PROCEDURE — 80053 COMPREHEN METABOLIC PANEL: CPT

## 2018-06-12 PROCEDURE — 84702 CHORIONIC GONADOTROPIN TEST: CPT

## 2018-06-12 PROCEDURE — 85027 COMPLETE CBC AUTOMATED: CPT

## 2018-06-12 PROCEDURE — 96374 THER/PROPH/DIAG INJ IV PUSH: CPT

## 2018-06-12 PROCEDURE — 99284 EMERGENCY DEPT VISIT MOD MDM: CPT | Mod: 25

## 2018-06-12 PROCEDURE — 87491 CHLMYD TRACH DNA AMP PROBE: CPT

## 2018-06-12 PROCEDURE — 81001 URINALYSIS AUTO W/SCOPE: CPT

## 2018-06-12 PROCEDURE — 84443 ASSAY THYROID STIM HORMONE: CPT

## 2018-06-12 PROCEDURE — 36415 COLL VENOUS BLD VENIPUNCTURE: CPT

## 2018-06-12 PROCEDURE — 99285 EMERGENCY DEPT VISIT HI MDM: CPT | Mod: 25

## 2018-06-12 PROCEDURE — 87591 N.GONORRHOEAE DNA AMP PROB: CPT

## 2018-06-12 RX ORDER — METHENAMINE MANDELATE 1 G
1 TABLET ORAL
Qty: 20 | Refills: 0 | OUTPATIENT
Start: 2018-06-12 | End: 2018-06-21

## 2018-06-12 RX ORDER — CEPHALEXIN 500 MG
1 CAPSULE ORAL
Qty: 20 | Refills: 0 | OUTPATIENT
Start: 2018-06-12 | End: 2018-06-21

## 2018-06-12 RX ORDER — ONDANSETRON 8 MG/1
4 TABLET, FILM COATED ORAL ONCE
Qty: 0 | Refills: 0 | Status: COMPLETED | OUTPATIENT
Start: 2018-06-12 | End: 2018-06-12

## 2018-06-12 RX ADMIN — ONDANSETRON 4 MILLIGRAM(S): 8 TABLET, FILM COATED ORAL at 02:05

## 2018-06-12 RX ADMIN — Medication 50 MILLIGRAM(S): at 02:01

## 2018-06-12 RX ADMIN — Medication 1 MILLIGRAM(S): at 03:29
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
## 2018-06-12 NOTE — ED ADULT NURSE REASSESSMENT NOTE - NS ED NURSE REASSESS COMMENT FT1
pt denied pain at this time, no s/s of withdrawal noted will continue to monitor
Pt resting comfortably in stretcher, requesting HIV testing, MD Ryan aware, pt states "I feel so much better than before when I got here", offers no complaints at this time, will continue to monitor.

## 2018-06-12 NOTE — ED PROVIDER NOTE - MEDICAL DECISION MAKING DETAILS
Pt requesting medical detox with no indication at this time. Will observe for sobriety. Pt likely to be d/c to follow up with out-pt detox home.

## 2018-06-12 NOTE — ED PROVIDER NOTE - CONSTITUTIONAL, MLM
normal... Awake, alert, oriented to person, place, time/situation. Strong smell of alcohol on breath.

## 2018-06-12 NOTE — ED PROVIDER NOTE - OBJECTIVE STATEMENT
42 y/o F with hx of alcohol abuse presents to ED c/o intoxication. She states she wants a 24 hour medical detoxification so she can go to a sober home; she states she has been through detox many times. Pt has been sober up until 1 month ago. She states she has been drinking wine and beer. Denies SI, HI. denies fever. denies HA or neck pain. no chest pain or sob. no abd pain. no n/v/d. no urinary f/u/d. no back pain. no weakness. no motor or sensory deficits. denies illicit drug use. no recent travel. Pt also mentions a redness on the skin of the upper thigh, near the vagina; she reports it is from an old abscess. No further complaints at this time. PMD: Gerald 42 y/o F with hx of alcohol abuse presents to ED c/o intoxication. She states she wants a 24 hour medical detoxification so she can go to a sober home; she states she has been through detox many times. Pt has been sober up until 1 month ago. She states she has been drinking wine and beer. Denies SI, HI. denies fever. denies HA or neck pain. no chest pain or sob. no abd pain. no n/v/d. no urinary f/u/d. no back pain. no weakness. no motor or sensory deficits. denies illicit drug use. no recent travel. Pt also mentions redness on the skin of the upper thigh between the legs; she reports it is from an old abscess but she is unsure if there is an infection. No further complaints at this time. PMD: Gerald

## 2018-06-13 LAB
C TRACH RRNA SPEC QL NAA+PROBE: SIGNIFICANT CHANGE UP
N GONORRHOEA RRNA SPEC QL NAA+PROBE: SIGNIFICANT CHANGE UP
SPECIMEN SOURCE: SIGNIFICANT CHANGE UP

## 2018-06-14 ENCOUNTER — TRANSCRIPTION ENCOUNTER (OUTPATIENT)
Age: 43
End: 2018-06-14

## 2018-08-02 ENCOUNTER — EMERGENCY (EMERGENCY)
Facility: HOSPITAL | Age: 43
LOS: 1 days | Discharge: HOME | End: 2018-08-02
Attending: EMERGENCY MEDICINE
Payer: MEDICAID

## 2018-08-02 VITALS
DIASTOLIC BLOOD PRESSURE: 78 MMHG | TEMPERATURE: 98 F | HEART RATE: 120 BPM | OXYGEN SATURATION: 98 % | RESPIRATION RATE: 16 BRPM | SYSTOLIC BLOOD PRESSURE: 144 MMHG

## 2018-08-02 VITALS — WEIGHT: 160.06 LBS | HEIGHT: 63 IN

## 2018-08-02 PROBLEM — F10.10 ALCOHOL ABUSE, UNCOMPLICATED: Chronic | Status: ACTIVE | Noted: 2017-10-30

## 2018-08-02 PROCEDURE — 73090 X-RAY EXAM OF FOREARM: CPT | Mod: 26,LT

## 2018-08-02 PROCEDURE — 73090 X-RAY EXAM OF FOREARM: CPT

## 2018-08-02 PROCEDURE — 73080 X-RAY EXAM OF ELBOW: CPT

## 2018-08-02 PROCEDURE — 99284 EMERGENCY DEPT VISIT MOD MDM: CPT

## 2018-08-02 PROCEDURE — 73080 X-RAY EXAM OF ELBOW: CPT | Mod: 26,RT

## 2018-08-02 PROCEDURE — 99283 EMERGENCY DEPT VISIT LOW MDM: CPT

## 2018-08-02 RX ORDER — IBUPROFEN 200 MG
1 TABLET ORAL
Qty: 15 | Refills: 0
Start: 2018-08-02 | End: 2018-08-06

## 2018-08-02 RX ORDER — IBUPROFEN 200 MG
600 TABLET ORAL ONCE
Qty: 0 | Refills: 0 | Status: COMPLETED | OUTPATIENT
Start: 2018-08-02 | End: 2018-08-02

## 2018-08-02 RX ADMIN — Medication 600 MILLIGRAM(S): at 17:20

## 2018-08-02 NOTE — ED ADULT NURSE NOTE - CHIEF COMPLAINT QUOTE
Pt ambulatory in ED c/o right arm pain, reports she slipped on wet grass last night. + bruising and swelling noted to right arm. Pt guarding, limited ROM.

## 2018-08-02 NOTE — ED STATDOCS - PHYSICAL EXAMINATION
Musculoskeletal:  Full ROM of fingers and wrist, 2+ radial pulse, cap refill < 2 sec.  Can roll shoulder, not able to move elbow.  Skin:  bruising to elbow, proximal forearm and distal upper anne, swelling od area.

## 2018-08-02 NOTE — ED STATDOCS - OBJECTIVE STATEMENT
44 y/o F slipped and fell on grass last night.  She presents with c/o right elbow swelling, bruising and difficulty moving.  She states that she is an alcoholic and does not want any narcotics for pain.  She has not taken tylenol or ibuprofen.  Denies paresthesia to hand.

## 2018-08-02 NOTE — ED ADULT TRIAGE NOTE - CHIEF COMPLAINT QUOTE
Pt ambulatory in ED c/o right arm pain, reports she slipped on wet grass last night. + bruising and swelling noted to right arm. Pt ambulatory in ED c/o right arm pain, reports she slipped on wet grass last night. + bruising and swelling noted to right arm. Pt guarding, limited ROM.

## 2018-08-02 NOTE — ED PROCEDURE NOTE - CPROC ED POST PROC CARE GUIDE1
Instructed patient/caregiver regarding signs and symptoms of infection./Elevate the injured extremity as instructed./Verbal/written post procedure instructions were given to patient/caregiver.

## 2018-08-02 NOTE — ED ADULT NURSE NOTE - OBJECTIVE STATEMENT
44 y/o male presents to the ed c/o right arm pain s/p slip and fall on grass yesterday. she states that she is an every day drinker but has not drank anything today. pt is awake alert oriented following commands and speaking coherently. limited ROM

## 2018-08-02 NOTE — ED ADULT NURSE NOTE - NSIMPLEMENTINTERV_GEN_ALL_ED
Implemented All Fall Risk Interventions:  [place] to call system. Call bell, personal items and telephone within reach. Instruct patient to call for assistance. Room bathroom lighting operational. Non-slip footwear when patient is off stretcher. Physically safe environment: no spills, clutter or unnecessary equipment. Stretcher in lowest position, wheels locked, appropriate side rails in place. Provide visual cue, wrist band, yellow gown, etc. Monitor gait and stability. Monitor for mental status changes and reorient to person, place, and time. Review medications for side effects contributing to fall risk. Reinforce activity limits and safety measures with patient and family.

## 2018-08-02 NOTE — ED STATDOCS - ATTENDING CONTRIBUTION TO CARE
42 y/o F slipped and fell on grass last night.  She presents with c/o right elbow swelling, bruising and difficulty moving.  She states that she is an alcoholic and does not want any narcotics for pain.  She has not taken tylenol or ibuprofen.  pe  rt elbow mild swelling decreased rom xray , pain meds and reeval

## 2018-08-17 ENCOUNTER — EMERGENCY (EMERGENCY)
Facility: HOSPITAL | Age: 43
LOS: 1 days | Discharge: OTHER INSTITUTION | End: 2018-08-17
Attending: EMERGENCY MEDICINE
Payer: MEDICAID

## 2018-08-17 VITALS
DIASTOLIC BLOOD PRESSURE: 78 MMHG | TEMPERATURE: 99 F | SYSTOLIC BLOOD PRESSURE: 119 MMHG | RESPIRATION RATE: 22 BRPM | WEIGHT: 154.98 LBS | OXYGEN SATURATION: 97 % | HEIGHT: 63 IN | HEART RATE: 120 BPM

## 2018-08-17 VITALS
TEMPERATURE: 98 F | HEART RATE: 81 BPM | DIASTOLIC BLOOD PRESSURE: 81 MMHG | RESPIRATION RATE: 18 BRPM | SYSTOLIC BLOOD PRESSURE: 124 MMHG | OXYGEN SATURATION: 100 %

## 2018-08-17 DIAGNOSIS — F32.9 MAJOR DEPRESSIVE DISORDER, SINGLE EPISODE, UNSPECIFIED: ICD-10-CM

## 2018-08-17 DIAGNOSIS — Z79.1 LONG TERM (CURRENT) USE OF NON-STEROIDAL ANTI-INFLAMMATORIES (NSAID): ICD-10-CM

## 2018-08-17 DIAGNOSIS — R45.851 SUICIDAL IDEATIONS: ICD-10-CM

## 2018-08-17 DIAGNOSIS — R69 ILLNESS, UNSPECIFIED: ICD-10-CM

## 2018-08-17 DIAGNOSIS — R44.3 HALLUCINATIONS, UNSPECIFIED: ICD-10-CM

## 2018-08-17 DIAGNOSIS — R23.3 SPONTANEOUS ECCHYMOSES: ICD-10-CM

## 2018-08-17 DIAGNOSIS — F10.20 ALCOHOL DEPENDENCE, UNCOMPLICATED: ICD-10-CM

## 2018-08-17 LAB
ALBUMIN SERPL ELPH-MCNC: 4.6 G/DL — SIGNIFICANT CHANGE UP (ref 3.3–5.2)
ALP SERPL-CCNC: 109 U/L — SIGNIFICANT CHANGE UP (ref 40–120)
ALT FLD-CCNC: 52 U/L — HIGH
ANION GAP SERPL CALC-SCNC: 19 MMOL/L — HIGH (ref 5–17)
APAP SERPL-MCNC: <7.5 UG/ML — LOW (ref 10–26)
APPEARANCE UR: ABNORMAL
APTT BLD: 25.4 SEC — LOW (ref 27.5–37.4)
AST SERPL-CCNC: 58 U/L — HIGH
BACTERIA # UR AUTO: ABNORMAL
BASOPHILS # BLD AUTO: 0 K/UL — SIGNIFICANT CHANGE UP (ref 0–0.2)
BASOPHILS NFR BLD AUTO: 1 % — SIGNIFICANT CHANGE UP (ref 0–2)
BILIRUB SERPL-MCNC: 0.9 MG/DL — SIGNIFICANT CHANGE UP (ref 0.4–2)
BILIRUB UR-MCNC: ABNORMAL
BUN SERPL-MCNC: 31 MG/DL — HIGH (ref 8–20)
CALCIUM SERPL-MCNC: 10.5 MG/DL — HIGH (ref 8.6–10.2)
CHLORIDE SERPL-SCNC: 95 MMOL/L — LOW (ref 98–107)
CO2 SERPL-SCNC: 24 MMOL/L — SIGNIFICANT CHANGE UP (ref 22–29)
COLOR SPEC: YELLOW — SIGNIFICANT CHANGE UP
CREAT SERPL-MCNC: 1.08 MG/DL — SIGNIFICANT CHANGE UP (ref 0.5–1.3)
DIFF PNL FLD: ABNORMAL
EOSINOPHIL # BLD AUTO: 0.1 K/UL — SIGNIFICANT CHANGE UP (ref 0–0.5)
EOSINOPHIL NFR BLD AUTO: 1 % — SIGNIFICANT CHANGE UP (ref 0–5)
EPI CELLS # UR: ABNORMAL
ETHANOL SERPL-MCNC: <10 MG/DL — SIGNIFICANT CHANGE UP
GLUCOSE SERPL-MCNC: 139 MG/DL — HIGH (ref 70–115)
GLUCOSE UR QL: NEGATIVE MG/DL — SIGNIFICANT CHANGE UP
HCG SERPL-ACNC: <5 MIU/ML — SIGNIFICANT CHANGE UP
HCT VFR BLD CALC: 37.3 % — SIGNIFICANT CHANGE UP (ref 37–47)
HGB BLD-MCNC: 12.7 G/DL — SIGNIFICANT CHANGE UP (ref 12–16)
HYALINE CASTS # UR AUTO: ABNORMAL /LPF
INR BLD: 1.32 RATIO — HIGH (ref 0.88–1.16)
KETONES UR-MCNC: ABNORMAL
LEUKOCYTE ESTERASE UR-ACNC: ABNORMAL
LYMPHOCYTES # BLD AUTO: 0.8 K/UL — LOW (ref 1–4.8)
LYMPHOCYTES # BLD AUTO: 10 % — LOW (ref 20–55)
MAGNESIUM SERPL-MCNC: 1.7 MG/DL — SIGNIFICANT CHANGE UP (ref 1.6–2.6)
MCHC RBC-ENTMCNC: 33.6 PG — HIGH (ref 27–31)
MCHC RBC-ENTMCNC: 34 G/DL — SIGNIFICANT CHANGE UP (ref 32–36)
MCV RBC AUTO: 98.7 FL — SIGNIFICANT CHANGE UP (ref 81–99)
MONOCYTES # BLD AUTO: 0.7 K/UL — SIGNIFICANT CHANGE UP (ref 0–0.8)
MONOCYTES NFR BLD AUTO: 9 % — SIGNIFICANT CHANGE UP (ref 3–10)
NEUTROPHILS # BLD AUTO: 6.4 K/UL — SIGNIFICANT CHANGE UP (ref 1.8–8)
NEUTROPHILS NFR BLD AUTO: 76 % — HIGH (ref 37–73)
NEUTS BAND # BLD: 2 % — SIGNIFICANT CHANGE UP (ref 0–8)
NITRITE UR-MCNC: POSITIVE
PH UR: 6 — SIGNIFICANT CHANGE UP (ref 5–8)
PLAT MORPH BLD: ABNORMAL
PLATELET # BLD AUTO: 94 K/UL — LOW (ref 150–400)
POTASSIUM SERPL-MCNC: 3.3 MMOL/L — LOW (ref 3.5–5.3)
POTASSIUM SERPL-SCNC: 3.3 MMOL/L — LOW (ref 3.5–5.3)
PROT SERPL-MCNC: 8.1 G/DL — SIGNIFICANT CHANGE UP (ref 6.6–8.7)
PROT UR-MCNC: 100 MG/DL
PROTHROM AB SERPL-ACNC: 14.6 SEC — HIGH (ref 9.8–12.7)
RBC # BLD: 3.78 M/UL — LOW (ref 4.4–5.2)
RBC # FLD: 17.1 % — HIGH (ref 11–15.6)
RBC BLD AUTO: NORMAL — SIGNIFICANT CHANGE UP
RBC CASTS # UR COMP ASSIST: ABNORMAL /HPF (ref 0–4)
SALICYLATES SERPL-MCNC: <0.6 MG/DL — LOW (ref 10–20)
SODIUM SERPL-SCNC: 138 MMOL/L — SIGNIFICANT CHANGE UP (ref 135–145)
SP GR SPEC: 1.02 — SIGNIFICANT CHANGE UP (ref 1.01–1.02)
TSH SERPL-MCNC: 1.96 UIU/ML — SIGNIFICANT CHANGE UP (ref 0.27–4.2)
UROBILINOGEN FLD QL: 8 MG/DL
VARIANT LYMPHS # BLD: 1 % — SIGNIFICANT CHANGE UP (ref 0–6)
WBC # BLD: 8 K/UL — SIGNIFICANT CHANGE UP (ref 4.8–10.8)
WBC # FLD AUTO: 8 K/UL — SIGNIFICANT CHANGE UP (ref 4.8–10.8)
WBC UR QL: SIGNIFICANT CHANGE UP

## 2018-08-17 PROCEDURE — 93005 ELECTROCARDIOGRAM TRACING: CPT

## 2018-08-17 PROCEDURE — 36415 COLL VENOUS BLD VENIPUNCTURE: CPT

## 2018-08-17 PROCEDURE — 85027 COMPLETE CBC AUTOMATED: CPT

## 2018-08-17 PROCEDURE — 99285 EMERGENCY DEPT VISIT HI MDM: CPT

## 2018-08-17 PROCEDURE — 80053 COMPREHEN METABOLIC PANEL: CPT

## 2018-08-17 PROCEDURE — 99053 MED SERV 10PM-8AM 24 HR FAC: CPT

## 2018-08-17 PROCEDURE — 85610 PROTHROMBIN TIME: CPT

## 2018-08-17 PROCEDURE — 93010 ELECTROCARDIOGRAM REPORT: CPT

## 2018-08-17 PROCEDURE — 81001 URINALYSIS AUTO W/SCOPE: CPT

## 2018-08-17 PROCEDURE — 83735 ASSAY OF MAGNESIUM: CPT

## 2018-08-17 PROCEDURE — 85730 THROMBOPLASTIN TIME PARTIAL: CPT

## 2018-08-17 PROCEDURE — 80307 DRUG TEST PRSMV CHEM ANLYZR: CPT

## 2018-08-17 PROCEDURE — 84702 CHORIONIC GONADOTROPIN TEST: CPT

## 2018-08-17 PROCEDURE — 99285 EMERGENCY DEPT VISIT HI MDM: CPT | Mod: 25

## 2018-08-17 PROCEDURE — 84443 ASSAY THYROID STIM HORMONE: CPT

## 2018-08-17 RX ORDER — CEFUROXIME AXETIL 250 MG
250 TABLET ORAL EVERY 12 HOURS
Qty: 0 | Refills: 0 | Status: DISCONTINUED | OUTPATIENT
Start: 2018-08-17 | End: 2018-08-22

## 2018-08-17 RX ORDER — IBUPROFEN 200 MG
600 TABLET ORAL ONCE
Qty: 0 | Refills: 0 | Status: COMPLETED | OUTPATIENT
Start: 2018-08-17 | End: 2018-08-17

## 2018-08-17 RX ORDER — THIAMINE MONONITRATE (VIT B1) 100 MG
100 TABLET ORAL DAILY
Qty: 0 | Refills: 0 | Status: DISCONTINUED | OUTPATIENT
Start: 2018-08-17 | End: 2018-08-22

## 2018-08-17 RX ORDER — IBUPROFEN 200 MG
400 TABLET ORAL EVERY 8 HOURS
Qty: 0 | Refills: 0 | Status: DISCONTINUED | OUTPATIENT
Start: 2018-08-17 | End: 2018-08-22

## 2018-08-17 RX ORDER — NICOTINE POLACRILEX 2 MG
1 GUM BUCCAL DAILY
Qty: 0 | Refills: 0 | Status: DISCONTINUED | OUTPATIENT
Start: 2018-08-17 | End: 2018-08-22

## 2018-08-17 RX ORDER — POTASSIUM CHLORIDE 20 MEQ
40 PACKET (EA) ORAL ONCE
Qty: 0 | Refills: 0 | Status: COMPLETED | OUTPATIENT
Start: 2018-08-17 | End: 2018-08-17

## 2018-08-17 RX ADMIN — Medication 250 MILLIGRAM(S): at 13:29

## 2018-08-17 RX ADMIN — Medication 100 MILLIGRAM(S): at 17:19

## 2018-08-17 RX ADMIN — Medication 1 PATCH: at 18:53

## 2018-08-17 RX ADMIN — Medication 600 MILLIGRAM(S): at 11:53

## 2018-08-17 RX ADMIN — Medication 40 MILLIEQUIVALENT(S): at 13:29
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
## 2018-08-17 NOTE — ED BEHAVIORAL HEALTH ASSESSMENT NOTE - AXIS IV
Problem related to social environment/Housing problems/Occupational problems/Problems with primary support

## 2018-08-17 NOTE — ED BEHAVIORAL HEALTH ASSESSMENT NOTE - SUICIDE RISK FACTORS
History of abuse/trauma/Access to means (pills, firearms, etc.)/Hopelessness/Mood episode/Highly impulsive behavior/Global insomnia/Anhedonia/Substance abuse/dependence/Unable to engage in safety planning

## 2018-08-17 NOTE — ED BEHAVIORAL HEALTH ASSESSMENT NOTE - THOUGHT PROCESS
CHIEF COMPLAINT:  Follow up for breast cancer, left breast    ONCOLOGY PROBLEM LIST:  Problem   Malignant Neoplasm of Left Female Breast (Cms/Hcc)    1.  Invasive ductal carcinoma of the left breast diagnosed after mammogram revealed an abnormality in 09/2012.  Biopsy confirmed diagnosis.  2.  Breast MRI was concerning for a second lesion within the left breast. This was biopsied in October and was found to be malignant  3.  Left mastectomy 11/06/2012 with immediate tissue expander placement was performed.  4.  Pathology revealed a grade II, 1.2-cm, invasive ductal carcinoma with associated intermediate grade DCIS spanning 2.5 cm.  There was no evidence of lymphovascular invasion and margins were negative.  A second focus within the mastectomy site of DCIS at 1.5 cm was noted.  There was a lymph node within the specimen which was positive at 0.3 cm without extranodal extension.  The tumor is ER/NM positive and HER-2 negative.  Pathologic staging pT1c pN1a M0, stage 2.  5.  Initial recommendation was for axillary lymph node dissection due to lack of true sentinel lymph node biopsy.  6.  Second opinion was obtained with Dr. Wilcox at [facility] and he recommended, in lieu of an axillary dissection, definitive axillary radiation in the adjuvant setting along with adjuvant chemotherapy and adjuvant endocrine therapy.  7.  PET scan for staging was negative for metastatic disease.  8.  The patient initiated adjuvant chemotherapy with Taxotere plus Cytoxan on 01/08/2013 and completed 4 cycles in 3/2013.   9.  Course complicated by development of RUE DVT associated with mediport.  10.  Adjuvant radiation therapy to the left chest wall and draining lymphatics completed on 6/17/13.  11.  Arimidex in the adjuvant endocrine setting started 6/2013  12.  Reconstruction completed 11/2013.  13.  Switched to Femara due to toxicity                 HPI:  This is a 61 year old with history of breast cancer undergoing  adjuvant endocrine therapy who presents for scheduled follow up.    At this time, she complains of overall stable pain. Patient notes a recent sickness, possibly viral. She is otherwise feeling well. Patient denies fever, chills, sweats, poor appetite, unintentional weight loss, weight gain, headache, dizziness, rhinorrhea, epistaxis, neck pain, dysphagia, chest pain, pleurisy, cough, dyspnea, GI symptoms: diarrhea, vomiting, nausea,  symptoms: hematuria, dysuria, edema, neuropathy / neuralgia, breast mass, nipple changes or discharge, skin rash, sleep disturbance, recurrent infections, memory difficulties and trouble concentrating. She has received her influenza vaccine this season. Patient has started a  business.    PMH/PSH, FAM, SOC HX, MED & ALLERGIES:  I have personally reviewed the past medical & surgical, family and social history sections, including the medications and allergies listed in the above medical record as well as the nursing notes as found in EPIC / EMR, and in Care Everywhere if applicable, as of 11/2/2017.  Any updates or changes necessary were made in EPIC.     ROS:  I personally reviewed all 14 organ systems with the patient and the pertinent positive and negative items are documented in the HPI.    PHYSICAL EXAM:       Vitals:    11/02/17 1223   BP: 154/74   Pulse: 82   Resp: 16   Temp: 98.5 °F (36.9 °C)   TempSrc: Oral   SpO2: 94%   Weight: 102.8 kg   Height: 5' 2.01\" (1.575 m)   PainSc:  0     ECOG Performance Status 0  GENERAL:  well developed, well appearing and in no acute physical distress  HEENT:  no scleral icterus, no conjunctival injection , no conjunctival pallor and moist intact oral mucosa  NECK:  supple no palpable mass  LYMPHATIC SYSTEM: Negative for bilateral  preauricular, postauricular, submental, submandibular, cervical, supraclavicular, infraclavicular, axillary LN  BREASTS:  Performed in the sitting and supine position.  Right breast: well healed incisions  with no skin changes, nipple changes, palpable masses or nipple discharge. Left breast exam: left reconstructed breast mount, well healed incision, no other skin changes, no palpable masses.  LUNGS: CTA bilaterally and no wheezing  CARDIOVASCULAR:  RRR and no extra heart sounds  ABDOMEN: soft, nontender, nondistended with normal bowel sounds, no palpable masses     MUSCULOSKELETAL: no cyanosis, clubbing and no edema   DERMATOLOGIC:skin is warm and dry    and no rash   PSYCHIATRIC:   A & O x 3 , normal mood and affect  NEUROLOGIC: cranial nerves 2-12 grossly intact and motor strength normal    LABS:      Recent Labs  Lab 11/02/17  1046 05/18/17  0838 05/02/17  1230   SODIUM 139 138 140   POTASSIUM 4.5 4.7 4.8   CHLORIDE 103 100 104   BUN 21* 22* 16   CREATININE 1.01* 0.87 0.91   GLUCOSE 100* 162* 157*   CALCIUM 9.1 9.1 9.3   ALBUMIN 3.6  --  3.6   GLOB 3.6  --  3.7   BILIRUBIN 0.5  --  0.5   AST 32  --  29   GPT 33  --  37       RADIOGRAPHIC IMAGING:  Mammo Screening Right    Result Date: 11/2/2017   EXAM: MAMMO SCREENING RIGHT DATE OF EXAM:  11/2/2017 11:19 AM. CLINICAL INDICATION:  Screening. Personal history of breast cancer. Status post left mastectomy in 2012 COMPARISON EXAMS:  11/1/2016 through 10/16/2012. TECHNIQUE:  Digital. Computer-Aided Detection was utilized. DENSITY:  There are scattered areas of fibroglandular density. FINDINGS:  No dominant masses, developing asymmetries, distortions or suspicious grouped microcalcifications are seen.         IMPRESSION:  No signs of malignancy in the right breast. In the absence of suspicious clinical change, recommend routine screening right mammography.         BI-RADS: 1 - Negative.  In compliance with federal regulations, the patient will be sent a lay summary of the results of this mammogram.       ASSESSMENT AND PLAN:  In summary, this is an 61 year old with history of breast cancer currently undergoing adjuvant endocrine therapy who presents for scheduled  follow up and discussion.     1.  Malignant neoplasm of left female breast, unspecified estrogen receptor status, unspecified site of breast (CMS/[facility]) [C50.912]  Patient has no clinical, laboratory, or radiographic evidence of disease relapse. She continues to tolerate adjuvant endocrine therapy well and will be due to complete five years in . We discussed discontinuation of medication at that time as there is no randomized, controlled clinical trial data to support extended endocrine therapy in the adjuvant setting. Patient will consider this. It is possible she may desire to go on extended therapy and that can be discussed at the next visit. Next mammogram due in one year. Patient is encouraged to continue SBE, maintain a healthy body weight, participate in weight bearing exercise and to avoid excess alcohol.  She should continue to take calcium and vitamin D supplement.  She is encouraged to notify the office for any new mass lesions or other concerns should the need arise.     2.  Back pain. Chronic and stable. In accordance with [facility] law, on 2017, I personally performed an ePDMP review as follows:  PDMP reviewed; therapy appropriate, no aberrant behavior identified, prescription for Hydrocodone authorized.    Follow up orders:    Patient agrees with this plan of care and wishes to proceed.  RTC 6 months [facility], MD exam Dr. Bacon  The patient was educated in the symptoms for which the patient should seek earlier follow up.    Patient reminded to call or RTC sooner should the need arise  Call the [facility] [phone] at any time for any of the followin.  Fevers > 100.5  2.  Symptoms of infection  3.  Uncontrolled nausea or vomiting  4.  Bleeding events or unexplained bruising.  5.  New or uncontrolled pain  6.  Other unusual symptoms or concerns.    All the patient's questions were answered, I believe, to her satisfaction.    This chart was documented by Lonnie Schaefer, acting as a scribe for  Jael Schaefer MD. 2017, 12:32 PM.     Call the [facility] [phone] at any time for any of the followin.  Fevers > 100.5  2.  Symptoms of infection  3.  Uncontrolled nausea or vomiting  4.  Bleeding events or unexplained bruising.  5.  New or uncontrolled pain  6.  Other unusual symptoms or concerns.         Circumstantial

## 2018-08-17 NOTE — ED BEHAVIORAL HEALTH ASSESSMENT NOTE - ACTIVATING EVENTS/STRESSORS
Recent losses/Recent humiliation/shame/Non-compliant with treatment/Pending incarceration or homelessness/Current or pending isolation

## 2018-08-17 NOTE — ED BEHAVIORAL HEALTH NOTE - BEHAVIORAL HEALTH NOTE
Social Work Note: as per MD patient would benefit from inpatient psychiatric admission at this time. Clinicals are being reviewed by [facility], legals are on chart. Social Work Note: as per MD patient would benefit from inpatient psychiatric admission at this time. Clinicals are being reviewed by [facility], legals are on chart. Insurance information not available on chart however patient reports she has [place] Medicaid.

## 2018-08-17 NOTE — ED BEHAVIORAL HEALTH ASSESSMENT NOTE - DETAILS
right arm pain recent falls and dizziness Sister anxiety Parents ETOH witness friend die of overdose, denies h/o physical or sexual abuse. Dr Portillo aware and placed patient on constant observation while on medicine father has custody Reports she has plan to EO on Heroin

## 2018-08-17 NOTE — SBIRT NOTE. - NSSBIRTSERVICES_GEN_A_ED
Smoking Cessation Information Provided/Full Screen ONLY/Referral to Treatment Provided/Brief Intervention Performed

## 2018-08-17 NOTE — ED PROVIDER NOTE - OBJECTIVE STATEMENT
44 y/o female comes in for evaluation after the police jessica   pt states she alcohol binges last drink yesterday , states had 5 drinks    initially pt denies having suicide ideations , now states that if d/c home will kill self  no drug use  pt states she has intermittent episodes

## 2018-08-17 NOTE — ED BEHAVIORAL HEALTH ASSESSMENT NOTE - DESCRIPTION
Hypokalemia received po K, UTI received antibiotic  since 2011, father has custody, 3 children, unemployed, recently evicted calm and cooperative

## 2018-08-17 NOTE — SBIRT NOTE. - NSSBIRTSERVICES_GEN_A_ED_FT
Naloxone Rescue Kit dispensed: Pt was educated about Naloxone and trained on how to assemble and utilize the kit. [place] [id number]  Provided SBIRT services: Full screen positive. Referral to Treatment attempted. Screening results were reviewed with the patient and patient was provided information about healthy guidelines and potential negative consequences associated with level of risk. Motivation and readiness to reduce or stop use was discussed and goals and activities to make changes were suggested/offered.  Options discussed for further evaluation and treatment, but referral to treatment was not completed because  Patient requires medical care -   Audit Score: 37  DAST Score: 5  Duration = 25 Minutes

## 2018-08-17 NOTE — ED ADULT NURSE REASSESSMENT NOTE - NS ED NURSE REASSESS COMMENT FT1
Charge ADRIENNE Soto made aware of 1:1 need
Jason from IRT at bedside and speaking with patient
patient was sent over from the main room cleared by ED attending, patient states she was drinking had about 5 drinks and was an an asshole to [employer], and was brought in to go to get rehab. Pt states she has had thoughts of suicide maybe use heroine, but no real plan. Pt states she is here for rehab.
Assumed care of pt @1930. Pt denies any current suicidal ideations or withdrawal symptoms. CIWA 0. Pt aware of plan of care. No pt complaints at this time.

## 2018-08-17 NOTE — ED BEHAVIORAL HEALTH ASSESSMENT NOTE - SUMMARY
Patient is a y/o DWF, undomiciled, denies PMH, with reported h/o of depression and ETOH abuse and occasional cocaine use, one prior suicide attempt by overdose, denies h/o DT's or withdrawal seizures, bibs seeking psychiatric evaluation.  Patient is depressed with plan to overdose on Heroin if discharged to the streets. She is verbalizing delusions regarding the [employer] and events that happened to her friends. Patient has recently been drinking however she does not have tremors does not appear to be in complicated withdrawal. Patient has guilt about her drinking, reports it has interfered with her family, has attempted to quick and recurrent use in dangerous situation.  Patient is in agreement to inpatient psychiatric hospitalization and will be admitted on a voluntary status once medically cleared

## 2018-08-17 NOTE — ED ADULT NURSE REASSESSMENT NOTE - CONDITION
Pt observed  tapping on all the decorations around the  glass enclosure  surrounding nursing area.  returned to bed resting./unchanged
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
## 2018-08-17 NOTE — ED ADULT NURSE REASSESSMENT NOTE - COMFORT CARE
meal provided/wait time explained/warm blanket provided/ambulated to bathroom/plan of care explained/po fluids offered

## 2018-08-17 NOTE — ED ADULT TRIAGE NOTE - CHIEF COMPLAINT QUOTE
patient to emergency room for pain in slipped and fell right arm discoloration also wants to see psych dept

## 2018-10-11 ENCOUNTER — APPOINTMENT (OUTPATIENT)
Dept: MAMMOGRAPHY | Facility: CLINIC | Age: 43
End: 2018-10-11

## 2018-12-08 NOTE — ED ADULT NURSE REASSESSMENT NOTE - VISUAL DISTURBANCES
Telephone Encounter by Bruna Cary RMA at 07/25/17 01:26 PM     Author:  Bruna Cary RMA Service:  (none) Author Type:  Medical Assistant     Filed:  07/25/17 01:26 PM Encounter Date:  7/24/2017 Status:  Signed     :  Bruna Cary RMA (Certified Medical Assistant)            Patient notified.  Courtney verbalized understanding of information given.[MA1.1T]    Pt states she will need to look at her schedule and call back.[MA1.1M]      Revision History        User Key Date/Time User Provider Type Action    > MA1.1 07/25/17 01:26 PM Bruna Cary RMA Certified Medical Assistant Sign    M - Manual, T - Template            
Telephone Encounter by Ingrid Cruz DO at 07/24/17 03:22 PM     Author:  Ingrid Cruz DO Service:  (none) Author Type:  Physician     Filed:  07/24/17 03:23 PM Encounter Date:  7/24/2017 Status:  Signed     :  Ingrid Cruz DO (Physician)            BP is still mildly high, improving though, which is great!  Pt to continue to focus on lifestyle changes, DASH diet, increased activity    Nurse visit in 3  Months for BP check[SD1.1M]      Revision History        User Key Date/Time User Provider Type Action    > SD1.1 07/24/17 03:23 PM Ingrid Cruz DO Physician Sign    M - Manual            
0=not present

## 2019-04-18 ENCOUNTER — TRANSCRIPTION ENCOUNTER (OUTPATIENT)
Age: 44
End: 2019-04-18

## 2019-06-01 ENCOUNTER — EMERGENCY (EMERGENCY)
Facility: HOSPITAL | Age: 44
LOS: 1 days | Discharge: HOME | End: 2019-06-01
Attending: EMERGENCY MEDICINE
Payer: MEDICAID

## 2019-06-01 VITALS
HEART RATE: 92 BPM | HEIGHT: 64 IN | TEMPERATURE: 98 F | RESPIRATION RATE: 20 BRPM | SYSTOLIC BLOOD PRESSURE: 119 MMHG | OXYGEN SATURATION: 96 % | WEIGHT: 154.98 LBS | DIASTOLIC BLOOD PRESSURE: 76 MMHG

## 2019-06-01 LAB
AMPHET UR-MCNC: NEGATIVE — SIGNIFICANT CHANGE UP
ANION GAP SERPL CALC-SCNC: 13 MMOL/L — SIGNIFICANT CHANGE UP (ref 5–17)
APAP SERPL-MCNC: <7.5 UG/ML — LOW (ref 10–26)
APPEARANCE UR: ABNORMAL
BACTERIA # UR AUTO: ABNORMAL
BARBITURATES UR SCN-MCNC: NEGATIVE — SIGNIFICANT CHANGE UP
BASOPHILS # BLD AUTO: 0.1 K/UL — SIGNIFICANT CHANGE UP (ref 0–0.2)
BASOPHILS NFR BLD AUTO: 0.6 % — SIGNIFICANT CHANGE UP (ref 0–2)
BENZODIAZ UR-MCNC: NEGATIVE — SIGNIFICANT CHANGE UP
BILIRUB UR-MCNC: NEGATIVE — SIGNIFICANT CHANGE UP
BUN SERPL-MCNC: 13 MG/DL — SIGNIFICANT CHANGE UP (ref 8–20)
CALCIUM SERPL-MCNC: 9.7 MG/DL — SIGNIFICANT CHANGE UP (ref 8.6–10.2)
CHLORIDE SERPL-SCNC: 100 MMOL/L — SIGNIFICANT CHANGE UP (ref 98–107)
CO2 SERPL-SCNC: 22 MMOL/L — SIGNIFICANT CHANGE UP (ref 22–29)
COCAINE METAB.OTHER UR-MCNC: NEGATIVE — SIGNIFICANT CHANGE UP
COLOR SPEC: YELLOW — SIGNIFICANT CHANGE UP
CREAT SERPL-MCNC: 0.67 MG/DL — SIGNIFICANT CHANGE UP (ref 0.5–1.3)
DIFF PNL FLD: NEGATIVE — SIGNIFICANT CHANGE UP
EOSINOPHIL # BLD AUTO: 0.3 K/UL — SIGNIFICANT CHANGE UP (ref 0–0.5)
EOSINOPHIL NFR BLD AUTO: 2 % — SIGNIFICANT CHANGE UP (ref 0–6)
EPI CELLS # UR: ABNORMAL
ETHANOL SERPL-MCNC: <10 MG/DL — SIGNIFICANT CHANGE UP
GLUCOSE SERPL-MCNC: 106 MG/DL — SIGNIFICANT CHANGE UP (ref 70–115)
GLUCOSE UR QL: NEGATIVE MG/DL — SIGNIFICANT CHANGE UP
HCG UR QL: NEGATIVE — SIGNIFICANT CHANGE UP
HCT VFR BLD CALC: 39.5 % — SIGNIFICANT CHANGE UP (ref 37–47)
HGB BLD-MCNC: 13.8 G/DL — SIGNIFICANT CHANGE UP (ref 12–16)
KETONES UR-MCNC: NEGATIVE — SIGNIFICANT CHANGE UP
LEUKOCYTE ESTERASE UR-ACNC: ABNORMAL
LYMPHOCYTES # BLD AUTO: 24.8 % — SIGNIFICANT CHANGE UP (ref 20–55)
LYMPHOCYTES # BLD AUTO: 4 K/UL — SIGNIFICANT CHANGE UP (ref 1–4.8)
MCHC RBC-ENTMCNC: 31.3 PG — HIGH (ref 27–31)
MCHC RBC-ENTMCNC: 34.9 G/DL — SIGNIFICANT CHANGE UP (ref 32–36)
MCV RBC AUTO: 89.6 FL — SIGNIFICANT CHANGE UP (ref 81–99)
METHADONE UR-MCNC: NEGATIVE — SIGNIFICANT CHANGE UP
MONOCYTES # BLD AUTO: 1.1 K/UL — HIGH (ref 0–0.8)
MONOCYTES NFR BLD AUTO: 6.9 % — SIGNIFICANT CHANGE UP (ref 3–10)
NEUTROPHILS # BLD AUTO: 10.4 K/UL — HIGH (ref 1.8–8)
NEUTROPHILS NFR BLD AUTO: 65.4 % — SIGNIFICANT CHANGE UP (ref 37–73)
NITRITE UR-MCNC: NEGATIVE — SIGNIFICANT CHANGE UP
OPIATES UR-MCNC: NEGATIVE — SIGNIFICANT CHANGE UP
PCP SPEC-MCNC: SIGNIFICANT CHANGE UP
PCP UR-MCNC: NEGATIVE — SIGNIFICANT CHANGE UP
PH UR: 6 — SIGNIFICANT CHANGE UP (ref 5–8)
PLATELET # BLD AUTO: 236 K/UL — SIGNIFICANT CHANGE UP (ref 150–400)
POTASSIUM SERPL-MCNC: 3.6 MMOL/L — SIGNIFICANT CHANGE UP (ref 3.5–5.3)
POTASSIUM SERPL-SCNC: 3.6 MMOL/L — SIGNIFICANT CHANGE UP (ref 3.5–5.3)
PROT UR-MCNC: 15 MG/DL
RBC # BLD: 4.41 M/UL — SIGNIFICANT CHANGE UP (ref 4.4–5.2)
RBC # FLD: 13 % — SIGNIFICANT CHANGE UP (ref 11–15.6)
RBC CASTS # UR COMP ASSIST: SIGNIFICANT CHANGE UP /HPF (ref 0–4)
SALICYLATES SERPL-MCNC: <0.6 MG/DL — LOW (ref 10–20)
SODIUM SERPL-SCNC: 135 MMOL/L — SIGNIFICANT CHANGE UP (ref 135–145)
SP GR SPEC: 1.01 — SIGNIFICANT CHANGE UP (ref 1.01–1.02)
THC UR QL: NEGATIVE — SIGNIFICANT CHANGE UP
UROBILINOGEN FLD QL: NEGATIVE MG/DL — SIGNIFICANT CHANGE UP
WBC # BLD: 16 K/UL — HIGH (ref 4.8–10.8)
WBC # FLD AUTO: 16 K/UL — HIGH (ref 4.8–10.8)
WBC UR QL: SIGNIFICANT CHANGE UP

## 2019-06-01 PROCEDURE — 99284 EMERGENCY DEPT VISIT MOD MDM: CPT

## 2019-06-01 PROCEDURE — 93010 ELECTROCARDIOGRAM REPORT: CPT

## 2019-06-01 RX ORDER — ACETAMINOPHEN 500 MG
650 TABLET ORAL ONCE
Refills: 0 | Status: COMPLETED | OUTPATIENT
Start: 2019-06-01 | End: 2019-06-01

## 2019-06-01 RX ORDER — BUPROPION HYDROCHLORIDE 150 MG/1
1 TABLET, EXTENDED RELEASE ORAL
Qty: 0 | Refills: 0 | DISCHARGE

## 2019-06-01 RX ADMIN — Medication 650 MILLIGRAM(S): at 23:46

## 2019-06-01 NOTE — ED PROVIDER NOTE - OBJECTIVE STATEMENT
45 y/o F, with hx of ETOH abuse, 10 months sober, presents to the ED from [facility] for medical evaluation.  Pt states on 5/25 she was started on Wellbutrin, however, pt states that the medicine is not agreeing with her.  Pt states that she spoke to her PCP and was told to stop taking the Wellbutrin, however [facility] did not receive notification of the change in medication.  Pt states that she has been refusing to take the Wellbutrin at [facility], however is not allowed to refuse medications.  Pt was sent to the ED from [facility] for non compliance.  Pt states that she was started on Wellbutrin because      in contact with MD JAMAL Nair was advised to come to hospital. pulled into office not taking meds at 7am want to take at 1030   perimenopausal no drugs or ertoh      Mazeer?  perimenopausol  less tolerant to people during PMS  recent blood workson passed away birthday just passed on thurs  [facility] don't want to relapse  new medicine agitated  relapse correlate with PMs  medicine since 25th no agreeing dr eloisa stop   non compliant with meds may have to leave program 45 y/o F, with hx of ETOH abuse, 10 months sober, presents to the ED from [facility] for medical evaluation.  Pt states on 5/25 she was started on Wellbutrin, however, pt states that the medicine is not agreeing with her.  Pt states that she spoke to her PCP and was told to stop taking the Wellbutrin, however [facility] did not receive notification of the change in medication.  Pt states that she has been refusing to take the Wellbutrin at [facility], however is not allowed to refuse medications.  Pt was sent to the ED from [facility] for non compliance. Notes that she is breathalyzed 2x a day and today said "I feel like I should drink today".  Notes today is her son's birthday that passed away and is often the cause of her relapse.  Pt states that she was started on Wellbutrin because "I am perimenopausal and am PMSing very bad".  No SI or HI.  No visual or auditory hallucinations.  No fever, chills, chest pain, cough, SOB, abd pain, N/V/D, back pain, or HA.  PCP: Dr. JAMAL Nair

## 2019-06-01 NOTE — ED ADULT NURSE NOTE - HPI (INCLUDE ILLNESS QUALITY, SEVERITY, DURATION, TIMING, CONTEXT, MODIFYING FACTORS, ASSOCIATED SIGNS AND SYMPTOMS)
pt arrives to unit in street clothes. waned by security for contraband.  as per pt she just wants to stop taking Wellbutrin.   I spoke with dr castorena this morning.  I also have an appointment with my doctor on monday. I cant believe this is happening to me. my emotions have been up and down.   I had some blood work done for my estrogen levels.  on 5/25 I was started on Wellbutrin I just want to stop in.  I am having adverse effects.  pt is laughing at times don't want to miss work in the morning.  pt is cooperative with blood work and urine .  plan of care discussed with pt.  pt changed into gown belonging secured and placed in labelled property bag.  as per pt on thur she didn't take  Wellbutrin and felt ok.  she also admits to felling a little down as this is the anniversary of sons death. premature birth.  she admits it is sad but is something she has learned to deal with.  she denies current s/i h/i denies avh. 0 -6.27

## 2019-06-01 NOTE — ED ADULT NURSE NOTE - OBJECTIVE STATEMENT
I want to stop my Wellbutrin but if I say I am not taking it [facility] will say I am non compliant.  I am having adverse reaction to it.  my emotions are going up and down. I started wellbutrin 5/25.   I am perimenopausal.  I am at [facility] voluntary.  I have been sober for 10 months.   they make me blow every day.  I also work.

## 2019-06-01 NOTE — ED PROVIDER NOTE - CLINICAL SUMMARY MEDICAL DECISION MAKING FREE TEXT BOX
43 y/o F, from [facility], sent in for evaluation because she refused medication and expressed need to drink ETOH, no active medical issues, will transfer to  for further evaluation.  Reeval

## 2019-06-01 NOTE — ED BEHAVIORAL HEALTH NOTE - BEHAVIORAL HEALTH NOTE
SW Note - pt sent here from [facility] where pt is long-term resident sobriety for >10 months  – pt MD Nair started pt on Wellbutrin and having suicidal thoughts, refused to take meds and they sent her here. Pt can call [facility] upon DC and they will send taxi - [phone]

## 2019-06-01 NOTE — ED ADULT TRIAGE NOTE - CHIEF COMPLAINT QUOTE
Pt A&Ox4 states "I started Wellbutrin a week ago and having suicidal thoughts and want to stop it."  BIBA from [facility] rehab c/o having a bad response to medication. Patient calm and cooperative, able to communicate effectively, wants to stop taking Wellbutrin but can't refuse medication at [facility], in contact with MD JAMAL Nair was advised to come to hospital.

## 2019-06-01 NOTE — ED PROVIDER NOTE - PROGRESS NOTE DETAILS
spoke to Ada DELEON at [facility], discussed pt's hx, physical, plan to give copy of labs, d/c on Zithromax, pt to return to [facility] by [facility] cabs

## 2019-06-01 NOTE — ED PROVIDER NOTE - NS ED ROS FT
no weight change, no fever or chills  no recent travel, no recent abox, no sick contacts  + recent change in medications  no rash, no bruises  no visual changes no eye discharge  +congestion, no cough or cold  no sob, no chest pain  no orthopnea, no pnd  no abd pain, no n/v/d  no hematuria, no change in urinary habits  no joint pain, no deformity  no headache, no paresthesia

## 2019-06-01 NOTE — ED PROVIDER NOTE - PHYSICAL EXAMINATION
Constitutional : Appears comfortably, talkative, talking in full sentences  Head :NC AT , no swelling  Eyes :eomi, no swelling  Mouth :mm moist,  Neck : supple, trachea in midline  Chest :Kendall air entry, symm chest expansion, no distress  Heart :S1 S2 distant  Abdomen :abd soft, non tender  Musc/Skel :ext no swelling, no deformity, no spine tenderness, distal pulses present  Neuro  :AAO 3 no focal deficits   Psych: No SI or HI, no paranoid delusions, racing thoughts, talkative, speaking fast

## 2019-06-02 VITALS
RESPIRATION RATE: 17 BRPM | OXYGEN SATURATION: 98 % | HEART RATE: 72 BPM | DIASTOLIC BLOOD PRESSURE: 52 MMHG | TEMPERATURE: 98 F | SYSTOLIC BLOOD PRESSURE: 91 MMHG

## 2019-06-02 DIAGNOSIS — R69 ILLNESS, UNSPECIFIED: ICD-10-CM

## 2019-06-02 DIAGNOSIS — F39 UNSPECIFIED MOOD [AFFECTIVE] DISORDER: ICD-10-CM

## 2019-06-02 PROCEDURE — 80048 BASIC METABOLIC PNL TOTAL CA: CPT

## 2019-06-02 PROCEDURE — 81001 URINALYSIS AUTO W/SCOPE: CPT

## 2019-06-02 PROCEDURE — 93005 ELECTROCARDIOGRAM TRACING: CPT

## 2019-06-02 PROCEDURE — 99285 EMERGENCY DEPT VISIT HI MDM: CPT

## 2019-06-02 PROCEDURE — 36415 COLL VENOUS BLD VENIPUNCTURE: CPT

## 2019-06-02 PROCEDURE — 80307 DRUG TEST PRSMV CHEM ANLYZR: CPT

## 2019-06-02 PROCEDURE — 81025 URINE PREGNANCY TEST: CPT

## 2019-06-02 PROCEDURE — 85027 COMPLETE CBC AUTOMATED: CPT

## 2019-06-02 PROCEDURE — 90792 PSYCH DIAG EVAL W/MED SRVCS: CPT | Mod: GT

## 2019-06-02 RX ORDER — AZITHROMYCIN 500 MG/1
1 TABLET, FILM COATED ORAL
Qty: 4 | Refills: 0
Start: 2019-06-02 | End: 2019-06-05

## 2019-06-02 RX ORDER — AZITHROMYCIN 500 MG/1
500 TABLET, FILM COATED ORAL ONCE
Refills: 0 | Status: COMPLETED | OUTPATIENT
Start: 2019-06-02 | End: 2019-06-02

## 2019-06-02 RX ADMIN — AZITHROMYCIN 500 MILLIGRAM(S): 500 TABLET, FILM COATED ORAL at 02:56

## 2019-06-02 NOTE — ED BEHAVIORAL HEALTH ASSESSMENT NOTE - SUMMARY
The patient is a 44-year-old, currently domiciled in a residential substance facility ([facility]), employed,  woman, non-caregiver, with a reported history of alcohol use disorder and unspecified mood (affective) disorder, 1 prior inpatient hospitalization for rehab, no prior suicide attempts or history of non-suicidal self-injury, no reported history of physical violence/aggression, no active legal issues, no active substance use (but a history of alcohol and cocaine abuse), no reported history of complicated alcohol withdrawal/DTs, no reported past medical history, who was brought in by EMS, referred by staff at her residential substance facility, for psychiatric evaluation after refusing her prescribed Wellbutrin. The patient reportedly made suicidal statements to staff at her facility and staff in the ED.    The patient is denying any acute suicidal or homicidal ideation/intent/plan and is not an imminent threat to self or others at this point in time. She does not require acute inpatient psychiatric hospitalization and is cleared to return to her residential substance facility. It is possible that the patient's presenting symptoms (including reported suicidality) may be related to her recently-started Wellbutrin and it is prudent to hold this medication for the time being until the patient is next seen by her PMD.

## 2019-06-02 NOTE — ED BEHAVIORAL HEALTH ASSESSMENT NOTE - DIFFERENTIAL
Medication-induced mood disorder vs. substance-induced mood disorder vs. unspecified mood (affective) disorder

## 2019-06-02 NOTE — ED BEHAVIORAL HEALTH ASSESSMENT NOTE - REFERRAL / APPOINTMENT DETAILS
The patient has been encouraged to follow-up with her outpatient therapist per her scheduled appointments. She has also been encouraged to follow-up with her PMD regarding her psychopharmacological management (and may benefit from referral to a psychiatrist for management).

## 2019-06-02 NOTE — ED BEHAVIORAL HEALTH ASSESSMENT NOTE - DESCRIPTION (FIRST USE, LAST USE, QUANTITY, FREQUENCY, DURATION)
The patient used to be a heavy drinker (1L per day) but reports that she has been sober for the past 10 months or so. The patient reports that she uses cocaine approximately 1-2 times per year (most recently approximately 1 year ago).

## 2019-06-02 NOTE — ED BEHAVIORAL HEALTH ASSESSMENT NOTE - MEDICATIONS (PRESCRIPTIONS, DIRECTIONS)
The patient has been encouraged to continue Zoloft 50mg PO daily. She has been encouraged to stop taking Wellbutrin for the time being until she can discuss further with her PMD.

## 2019-06-02 NOTE — ED BEHAVIORAL HEALTH ASSESSMENT NOTE - SAFETY PLAN DETAILS
The patient has been encouraged to call 911 or report to the nearest ED should she develop any active suicidal or homicidal ideation with intent and/or plan.

## 2019-06-02 NOTE — ED BEHAVIORAL HEALTH ASSESSMENT NOTE - OTHER PAST PSYCHIATRIC HISTORY (INCLUDE DETAILS REGARDING ONSET, COURSE OF ILLNESS, INPATIENT/OUTPATIENT TREATMENT)
The patient has had 1 prior inpatient hospitalization for rehab ([facility], 2018). She is not currently in treatment with an outpatient psychiatrist but has a therapist whom she sees 3 times per week. The patient does not have any prior suicide attempts, history of non-suicidal self-injury, or history of physical violence/aggression.

## 2019-06-02 NOTE — ED BEHAVIORAL HEALTH ASSESSMENT NOTE - HPI (INCLUDE ILLNESS QUALITY, SEVERITY, DURATION, TIMING, CONTEXT, MODIFYING FACTORS, ASSOCIATED SIGNS AND SYMPTOMS)
The patient is a 44-year-old, currently domiciled in a residential substance facility ([facility]), employed,  woman, non-caregiver, with a reported history of alcohol use disorder and unspecified mood (affective) disorder, 1 prior inpatient hospitalization for rehab, no prior suicide attempts or history of non-suicidal self-injury, no reported history of physical violence/aggression, no active legal issues, no active substance use (but a history of alcohol and cocaine abuse), no reported history of complicated alcohol withdrawal/DTs, no reported past medical history, who was brought in by EMS, referred by staff at her residential substance facility, for psychiatric evaluation after refusing her prescribed Wellbutrin. The patient reportedly made suicidal statements to staff at her facility and staff in the ED.    The patient reports that she has been experiencing "perimenopausal symptoms" as of late. She went to her doctor last week and was prescribed Wellbutrin. After she started taking the Wellbutrin, she started feeling increasingly "emotional and agitated," and contacted the head of  at [facility] this morning, who encouraged the patient to stop taking this medication. When she refused the medication at her residential substance facility, she was sent to the ED for psychiatric evaluation. The patient reports that she has been having vague suicidal thoughts (i.e. to get a bottle of vodka and go into the woods) without intent or plan since starting the Wellbutrin as well.    Over the course of the past 2 weeks, the patient reports that her mood has been "PMS-ing." She denies any acute symptoms of depression (i.e. depressed mood, anhedonia, insomnia, feelings of hopelessness, helplessness, or guilt, anergia, anorexia, poor concentration). The patient denies any acute suicidal or homicidal ideation/intent/plan and does not have any direct access to firearms. She denies any prior suicide attempts, history of non-suicidal self-injury, or history of physical violence/aggression. The patient denies any acute symptoms of michelle (i.e. prolonged periods of increased energy despite decreased need for sleep or euphoric/irritable mood) or psychosis (i.e. perceptual disturbances or suspiciousness/paranoia). The patient does not describe herself as an anxious person and denies any history of physical/sexual abuse. She states that she feels safe to return to her residential substance facility and would call 911 or report to the nearest ED should she develop any active suicidal or homicidal ideation with intent and/or plan.    No collateral information was readily available.

## 2019-06-02 NOTE — ED BEHAVIORAL HEALTH ASSESSMENT NOTE - DESCRIPTION
None reported The patient has been residing at a residential substance facility ([facility]) since April 2019. She had previously been residing at [facility]. The patient has an associate's degree and currently works as a . She is  and has 3 children (2 adults, 1 aged 17 who lives with his father). Vital Signs Last 24 Hrs  T(C): 36.8 (01 Jun 2019 23:40), Max: 36.8 (01 Jun 2019 23:40)  T(F): 98.3 (01 Jun 2019 23:40), Max: 98.3 (01 Jun 2019 23:40)  HR: 76 (01 Jun 2019 23:40) (76 - 92)  BP: 102/69 (01 Jun 2019 23:40) (102/69 - 119/76)  BP(mean): --  RR: 19 (01 Jun 2019 23:40) (19 - 20)  SpO2: 100% (01 Jun 2019 23:40) (96% - 100%)    ED course: Patient brought to ED by EMS at 21:22;  telepsychiatry consulted at 23:15 Patient presented with fair hygiene. Patient reported to triage nurse "I started Wellbutrin a week ago and having suicidal thoughts and want to stop it but can't refuse medication at [facility]”. Patient was cooperative with triage, patient provided blood and urine specimens willingly. Patient changed into hospital gown and allowed for security check without incident. Patient moved to [facility] without incident. Primary nurse reports patient expressed frustration with  protocol but understands why she was sent to ED. Patient was observed pacing for a few minutes by primary nurse. Per primary nurse patient presents with euthymic mood and full affect. Speech is noted to be at normal rate and volume. Patient is noted observed to be alert and oriented x4. Patient is logical and linear, denying SI/HI/AH/VH. Per primary nurse, patient reported that this time of year can be difficult due to a miscarriage some time ago but she reports being able to move past this. Per primary nurse patient is able to maintain eye contact and engaged appropriately with staff. Patient is noted to be in good behavioral control, not requiring security or medication intervention.

## 2019-06-02 NOTE — ED BEHAVIORAL HEALTH ASSESSMENT NOTE - RISK ASSESSMENT
Acute Suicide Risk  (  ) High   (  ) Moderate   ( X ) Low   (  ) Unable to determine   Rationale: The patient is at low acute risk for suicide. Her risk factors include recent reported suicidality and recent change in treatment. Her protective factors include no acute suicidal ideation/intent/plan, no known direct access to firearms, no active substance use, and engaged in work.    Elevated Chronic Risk   ( X ) Yes ___________  Details: The patient is at chronically elevated risk for suicide. Her risk factors include history of substance abuse and  marital status. Her protective factors include no reported family history of suicidality.    Safety Plan   Details: The patient has been encouraged to call 911 or report to the nearest ED should she develop any active suicidal ideation with intent and/or plan.  [ X ] Safety plan discussed with patient  [ X ] Education provided regarding environmental safety / lethal means restriction  [ X ] Provision of National Suicide Prevention Lifeline 6-610-745-TALK (0294)

## 2019-06-02 NOTE — ED BEHAVIORAL HEALTH ASSESSMENT NOTE - DETAILS
The patient denies any acute suicidal ideation/intent/plan but had reportedly endorsed vague suicidal thoughts to staff at her residential substance facility and staff in the ED. Maternal side of the family (HTN) Both parents (alcohol abuse) Currently unavailable.

## 2019-06-17 ENCOUNTER — APPOINTMENT (OUTPATIENT)
Dept: MAMMOGRAPHY | Facility: CLINIC | Age: 44
End: 2019-06-17

## 2019-06-28 ENCOUNTER — APPOINTMENT (OUTPATIENT)
Dept: DERMATOLOGY | Facility: CLINIC | Age: 44
End: 2019-06-28
Payer: MEDICAID

## 2019-06-28 ENCOUNTER — RESULT REVIEW (OUTPATIENT)
Age: 44
End: 2019-06-28

## 2019-06-28 PROCEDURE — 99203 OFFICE O/P NEW LOW 30 MIN: CPT | Mod: 25

## 2019-06-28 PROCEDURE — 11102 TANGNTL BX SKIN SINGLE LES: CPT

## 2019-08-09 ENCOUNTER — TRANSCRIPTION ENCOUNTER (OUTPATIENT)
Age: 44
End: 2019-08-09

## 2019-09-05 ENCOUNTER — TRANSCRIPTION ENCOUNTER (OUTPATIENT)
Age: 44
End: 2019-09-05

## 2020-09-16 ENCOUNTER — TRANSCRIPTION ENCOUNTER (OUTPATIENT)
Age: 45
End: 2020-09-16

## 2020-10-01 ENCOUNTER — OUTPATIENT (OUTPATIENT)
Dept: OUTPATIENT SERVICES | Facility: HOSPITAL | Age: 45
LOS: 1 days | End: 2020-10-01
Payer: MEDICAID

## 2020-10-12 NOTE — ED ADULT NURSE NOTE - IN THE PAST YEAR, HOW OFTEN HAVE YOU USED TOBACCO PRODUCTS?
Daily or almost daily Doxepin Pregnancy And Lactation Text: This medication is Pregnancy Category C and it isn't known if it is safe during pregnancy. It is also excreted in breast milk and breast feeding isn't recommended.

## 2020-10-20 ENCOUNTER — EMERGENCY (EMERGENCY)
Facility: HOSPITAL | Age: 45
LOS: 1 days | Discharge: HOME | End: 2020-10-20
Attending: STUDENT IN AN ORGANIZED HEALTH CARE EDUCATION/TRAINING PROGRAM
Payer: MEDICAID

## 2020-10-20 VITALS
OXYGEN SATURATION: 99 % | RESPIRATION RATE: 18 BRPM | HEART RATE: 66 BPM | SYSTOLIC BLOOD PRESSURE: 110 MMHG | DIASTOLIC BLOOD PRESSURE: 72 MMHG

## 2020-10-20 VITALS
WEIGHT: 179.9 LBS | DIASTOLIC BLOOD PRESSURE: 81 MMHG | RESPIRATION RATE: 16 BRPM | TEMPERATURE: 97 F | SYSTOLIC BLOOD PRESSURE: 126 MMHG | OXYGEN SATURATION: 97 % | HEART RATE: 82 BPM | HEIGHT: 64 IN

## 2020-10-20 LAB
ACETONE SERPL-MCNC: NEGATIVE — SIGNIFICANT CHANGE UP
ALBUMIN SERPL ELPH-MCNC: 4.2 G/DL — SIGNIFICANT CHANGE UP (ref 3.3–5.2)
ALP SERPL-CCNC: 107 U/L — SIGNIFICANT CHANGE UP (ref 40–120)
ALT FLD-CCNC: 27 U/L — SIGNIFICANT CHANGE UP
ANION GAP SERPL CALC-SCNC: 13 MMOL/L — SIGNIFICANT CHANGE UP (ref 5–17)
AST SERPL-CCNC: 17 U/L — SIGNIFICANT CHANGE UP
BASE EXCESS BLDV CALC-SCNC: -0.1 MMOL/L — SIGNIFICANT CHANGE UP (ref -2–2)
BASOPHILS # BLD AUTO: 0.1 K/UL — SIGNIFICANT CHANGE UP (ref 0–0.2)
BASOPHILS NFR BLD AUTO: 0.9 % — SIGNIFICANT CHANGE UP (ref 0–2)
BILIRUB SERPL-MCNC: <0.2 MG/DL — LOW (ref 0.4–2)
BUN SERPL-MCNC: 9 MG/DL — SIGNIFICANT CHANGE UP (ref 8–20)
CA-I SERPL-SCNC: 1.16 MMOL/L — SIGNIFICANT CHANGE UP (ref 1.15–1.33)
CALCIUM SERPL-MCNC: 9.1 MG/DL — SIGNIFICANT CHANGE UP (ref 8.6–10.2)
CHLORIDE BLDV-SCNC: 103 MMOL/L — SIGNIFICANT CHANGE UP (ref 98–107)
CHLORIDE SERPL-SCNC: 100 MMOL/L — SIGNIFICANT CHANGE UP (ref 98–107)
CO2 SERPL-SCNC: 20 MMOL/L — LOW (ref 22–29)
CREAT SERPL-MCNC: 0.56 MG/DL — SIGNIFICANT CHANGE UP (ref 0.5–1.3)
EOSINOPHIL # BLD AUTO: 0.1 K/UL — SIGNIFICANT CHANGE UP (ref 0–0.5)
EOSINOPHIL NFR BLD AUTO: 0.9 % — SIGNIFICANT CHANGE UP (ref 0–6)
GAS PNL BLDV: 137 MMOL/L — SIGNIFICANT CHANGE UP (ref 135–145)
GAS PNL BLDV: SIGNIFICANT CHANGE UP
GAS PNL BLDV: SIGNIFICANT CHANGE UP
GIANT PLATELETS BLD QL SMEAR: PRESENT — SIGNIFICANT CHANGE UP
GLUCOSE BLDV-MCNC: 195 MG/DL — HIGH (ref 70–99)
GLUCOSE SERPL-MCNC: 210 MG/DL — HIGH (ref 70–99)
HCO3 BLDV-SCNC: 24 MMOL/L — SIGNIFICANT CHANGE UP (ref 20–26)
HCT VFR BLD CALC: 37.6 % — SIGNIFICANT CHANGE UP (ref 34.5–45)
HCT VFR BLDA CALC: 41 — SIGNIFICANT CHANGE UP (ref 39–50)
HGB BLD CALC-MCNC: 13.4 G/DL — SIGNIFICANT CHANGE UP (ref 11.5–15.5)
HGB BLD-MCNC: 12.9 G/DL — SIGNIFICANT CHANGE UP (ref 11.5–15.5)
LACTATE BLDV-MCNC: 0.7 MMOL/L — SIGNIFICANT CHANGE UP (ref 0.5–2)
LIDOCAIN IGE QN: 24 U/L — SIGNIFICANT CHANGE UP (ref 22–51)
LYMPHOCYTES # BLD AUTO: 48.2 % — HIGH (ref 13–44)
LYMPHOCYTES # BLD AUTO: 5.14 K/UL — HIGH (ref 1–3.3)
MANUAL SMEAR VERIFICATION: SIGNIFICANT CHANGE UP
MCHC RBC-ENTMCNC: 32.5 PG — SIGNIFICANT CHANGE UP (ref 27–34)
MCHC RBC-ENTMCNC: 34.3 GM/DL — SIGNIFICANT CHANGE UP (ref 32–36)
MCV RBC AUTO: 94.7 FL — SIGNIFICANT CHANGE UP (ref 80–100)
MONOCYTES # BLD AUTO: 0.38 K/UL — SIGNIFICANT CHANGE UP (ref 0–0.9)
MONOCYTES NFR BLD AUTO: 3.6 % — SIGNIFICANT CHANGE UP (ref 2–14)
NEUTROPHILS # BLD AUTO: 4.95 K/UL — SIGNIFICANT CHANGE UP (ref 1.8–7.4)
NEUTROPHILS NFR BLD AUTO: 46.4 % — SIGNIFICANT CHANGE UP (ref 43–77)
OTHER CELLS CSF MANUAL: 13 ML/DL — LOW (ref 18–22)
PCO2 BLDV: 42 MMHG — SIGNIFICANT CHANGE UP (ref 35–50)
PH BLDV: 7.38 — SIGNIFICANT CHANGE UP (ref 7.32–7.43)
PLAT MORPH BLD: NORMAL — SIGNIFICANT CHANGE UP
PLATELET # BLD AUTO: 266 K/UL — SIGNIFICANT CHANGE UP (ref 150–400)
PO2 BLDV: 38 MMHG — SIGNIFICANT CHANGE UP (ref 25–45)
POTASSIUM BLDV-SCNC: 3.7 MMOL/L — SIGNIFICANT CHANGE UP (ref 3.4–4.5)
POTASSIUM SERPL-MCNC: 4 MMOL/L — SIGNIFICANT CHANGE UP (ref 3.5–5.3)
POTASSIUM SERPL-SCNC: 4 MMOL/L — SIGNIFICANT CHANGE UP (ref 3.5–5.3)
PROT SERPL-MCNC: 7.4 G/DL — SIGNIFICANT CHANGE UP (ref 6.6–8.7)
RBC # BLD: 3.97 M/UL — SIGNIFICANT CHANGE UP (ref 3.8–5.2)
RBC # FLD: 12 % — SIGNIFICANT CHANGE UP (ref 10.3–14.5)
RBC BLD AUTO: NORMAL — SIGNIFICANT CHANGE UP
SAO2 % BLDV: 71 % — SIGNIFICANT CHANGE UP
SARS-COV-2 RNA SPEC QL NAA+PROBE: SIGNIFICANT CHANGE UP
SODIUM SERPL-SCNC: 133 MMOL/L — LOW (ref 135–145)
WBC # BLD: 10.67 K/UL — HIGH (ref 3.8–10.5)
WBC # FLD AUTO: 10.67 K/UL — HIGH (ref 3.8–10.5)

## 2020-10-20 PROCEDURE — 99285 EMERGENCY DEPT VISIT HI MDM: CPT

## 2020-10-20 PROCEDURE — 82009 KETONE BODYS QUAL: CPT

## 2020-10-20 PROCEDURE — 82962 GLUCOSE BLOOD TEST: CPT

## 2020-10-20 PROCEDURE — U0003: CPT

## 2020-10-20 PROCEDURE — 80053 COMPREHEN METABOLIC PANEL: CPT

## 2020-10-20 PROCEDURE — 83605 ASSAY OF LACTIC ACID: CPT

## 2020-10-20 PROCEDURE — 85025 COMPLETE CBC W/AUTO DIFF WBC: CPT

## 2020-10-20 PROCEDURE — 82330 ASSAY OF CALCIUM: CPT

## 2020-10-20 PROCEDURE — 82947 ASSAY GLUCOSE BLOOD QUANT: CPT

## 2020-10-20 PROCEDURE — 36415 COLL VENOUS BLD VENIPUNCTURE: CPT

## 2020-10-20 PROCEDURE — 84295 ASSAY OF SERUM SODIUM: CPT

## 2020-10-20 PROCEDURE — 93010 ELECTROCARDIOGRAM REPORT: CPT

## 2020-10-20 PROCEDURE — 70450 CT HEAD/BRAIN W/O DYE: CPT | Mod: 26

## 2020-10-20 PROCEDURE — 84132 ASSAY OF SERUM POTASSIUM: CPT

## 2020-10-20 PROCEDURE — 82435 ASSAY OF BLOOD CHLORIDE: CPT

## 2020-10-20 PROCEDURE — 99284 EMERGENCY DEPT VISIT MOD MDM: CPT

## 2020-10-20 PROCEDURE — 82803 BLOOD GASES ANY COMBINATION: CPT

## 2020-10-20 PROCEDURE — 93005 ELECTROCARDIOGRAM TRACING: CPT

## 2020-10-20 PROCEDURE — 85014 HEMATOCRIT: CPT

## 2020-10-20 PROCEDURE — 85018 HEMOGLOBIN: CPT

## 2020-10-20 PROCEDURE — 83690 ASSAY OF LIPASE: CPT

## 2020-10-20 PROCEDURE — 84484 ASSAY OF TROPONIN QUANT: CPT

## 2020-10-20 PROCEDURE — 70450 CT HEAD/BRAIN W/O DYE: CPT

## 2020-10-20 RX ORDER — SODIUM CHLORIDE 9 MG/ML
1000 INJECTION INTRAMUSCULAR; INTRAVENOUS; SUBCUTANEOUS ONCE
Refills: 0 | Status: COMPLETED | OUTPATIENT
Start: 2020-10-20 | End: 2020-10-20

## 2020-10-20 RX ADMIN — SODIUM CHLORIDE 1000 MILLILITER(S): 9 INJECTION INTRAMUSCULAR; INTRAVENOUS; SUBCUTANEOUS at 01:35

## 2020-10-20 RX ADMIN — SODIUM CHLORIDE 1000 MILLILITER(S): 9 INJECTION INTRAMUSCULAR; INTRAVENOUS; SUBCUTANEOUS at 02:38

## 2020-10-20 NOTE — ED PROVIDER NOTE - ATTENDING CONTRIBUTION TO CARE
44yo female with pmh of DM (insulin) presents with hyperglycemia. PT states for the past week with lightheadedness and blurry vision with elevated FSG, Pt was diagnosed with DM recently. PT states the blurry vision is for distance, has not seen an ophthalmologist recently. Pt denies fevers/chills, ha, loc, focal neuro deficits, cp/sob/palp, cough, abd pain/n/v/d, urinary symptoms, recent travel and sick contacts.  Const: Awake, alert and oriented. In no acute distress. Well appearing.  HEENT: NC/AT. Moist mucous membranes.  Eyes: No scleral icterus. EOMI.  Neck:. Soft and supple. Full ROM without pain.  Cardiac: Regular rate and regular rhythm. +S1/S2. Peripheral pulses 2+ and symmetric. No LE edema.  Resp: Speaking in full sentences. No evidence of respiratory distress. No wheezes, rales or rhonchi.  Abd: Soft, non-tender, non-distended. Normal bowel sounds in all 4 quadrants. No guarding or rebound.  Back: Spine midline and non-tender. No CVAT.  Skin: No rashes, abrasions or lacerations.  Lymph: No cervical lymphadenopathy.  Neuro: Awake, alert & oriented x 3. Moves all extremities symmetrically. follows commands, [place] upper and lower ext 5/5, sensory symm and intact CN 2-12 grossly intact, no ataxia, no nystagmus, no dysmetria, no ddk, symm lake, no pronator drift  labs wnl, no evidence of dka/hhs, cth wnl, follow up with pmd and endo

## 2020-10-20 NOTE — ED ADULT TRIAGE NOTE - CHIEF COMPLAINT QUOTE
[facility] c/o feeling her sugar was high, newly diagnosed 1 week ago DM at [facility] pt felt she did not have good d/c instructions, pt feels blurred vision, and inc tiredness. took sugar at house after eating and per pt sugar was 300's

## 2020-10-20 NOTE — ED PROVIDER NOTE - OBJECTIVE STATEMENT
pt is a 46 y/o female recently diagnosed with diabetes type 1 one week ago at [facility]. pt states she had wound infection under breast - was treated with antibiotics and told her sugar was in the 400s. pt started on insulin. pt states for past week has been experiencing episodes of blurry vision, lightheadedness. pt denies cardiac hx. pt denies cp, sob, fevers, cough abd pain nausea vomiting neck pain. pt is past alcoholic has been in outreach program states sugar has been high the past week pt is a 46 y/o female recently diagnosed with diabetes type 1 one week ago at [facility]. pt states she had wound infection under breast - was treated with antibiotics and told her sugar was in the 400s. pt started on insulin. pt states for past week has been experiencing episodes of blurry vision that occur with distance, lightheadedness. pt denies cardiac hx. pt denies cp, sob, fevers, cough abd pain nausea vomiting neck pain. pt is past alcoholic has been in outreach program states sugar has been high the past week

## 2020-10-20 NOTE — ED ADULT NURSE REASSESSMENT NOTE - NS ED NURSE REASSESS COMMENT FT1
Pt awating discharge paperwork @ this time. As per Linda CAICEDO, [facility] [place] does not require COVID results prior to pt arrival. Okay to d/c pt @ this time.

## 2020-10-20 NOTE — ED ADULT NURSE REASSESSMENT NOTE - NS ED NURSE REASSESS COMMENT FT1
Pt ambulating w/steady gait. Pt has no medical complaints at this time. Respirations even & unlabored. NAD present. Awaiting COVID results @ this time.

## 2020-10-20 NOTE — ED ADULT NURSE NOTE - OBJECTIVE STATEMENT
Assumed pt care @0125. Pt received A&Ox4 c/o hyperglycemia. Pt states her BS was approx 390. Pt states she has not felt "right" with increased tiredness. for the past few days. Pt states she has been experiencing blurred vision for the past week. Pt was diagnosed with type2 DM this past Sunday @ [facility]. Pt states she takes insulin. Pt states she feels anxious @ this time. Pt denies any HA, fever, chills, dizziness, SOB, chest pain, NVD, dysuria. MAEx4 w/purpose. Resp even and unlabored. NAD present. Pt resting comfortably in stretcher w/call bell in reach. Pt safety maintained. Pt made aware of plan of care and verbalized understanding.

## 2020-10-20 NOTE — ED PROVIDER NOTE - PATIENT PORTAL LINK FT
You can access the FollowMyHealth Patient Portal offered by [facility] by registering at the following website: [email or web address]. By joining [employer]’s FollowMyHealth portal, you will also be able to view your health information using other applications (apps) compatible with our system.

## 2020-10-20 NOTE — ED ADULT NURSE NOTE - NSIMPLEMENTINTERV_GEN_ALL_ED
Implemented All Universal Safety Interventions:  [place] to call system. Call bell, personal items and telephone within reach. Instruct patient to call for assistance. Room bathroom lighting operational. Non-slip footwear when patient is off stretcher. Physically safe environment: no spills, clutter or unnecessary equipment. Stretcher in lowest position, wheels locked, appropriate side rails in place.

## 2020-10-22 ENCOUNTER — TRANSCRIPTION ENCOUNTER (OUTPATIENT)
Age: 45
End: 2020-10-22

## 2020-10-28 DIAGNOSIS — Z71.89 OTHER SPECIFIED COUNSELING: ICD-10-CM

## 2021-01-25 ENCOUNTER — TRANSCRIPTION ENCOUNTER (OUTPATIENT)
Age: 46
End: 2021-01-25

## 2021-05-02 ENCOUNTER — TRANSCRIPTION ENCOUNTER (OUTPATIENT)
Age: 46
End: 2021-05-02

## 2021-05-05 ENCOUNTER — EMERGENCY (EMERGENCY)
Facility: HOSPITAL | Age: 46
LOS: 1 days | Discharge: HOME | End: 2021-05-05
Payer: MEDICAID

## 2021-05-05 VITALS
RESPIRATION RATE: 16 BRPM | HEIGHT: 64 IN | SYSTOLIC BLOOD PRESSURE: 151 MMHG | HEART RATE: 85 BPM | TEMPERATURE: 98 F | OXYGEN SATURATION: 99 % | DIASTOLIC BLOOD PRESSURE: 71 MMHG

## 2021-05-05 VITALS
HEART RATE: 87 BPM | HEIGHT: 64 IN | WEIGHT: 149.91 LBS | SYSTOLIC BLOOD PRESSURE: 156 MMHG | DIASTOLIC BLOOD PRESSURE: 86 MMHG | TEMPERATURE: 98 F | RESPIRATION RATE: 18 BRPM | OXYGEN SATURATION: 96 %

## 2021-05-05 PROCEDURE — L9991: CPT

## 2021-05-05 PROCEDURE — 99281 EMR DPT VST MAYX REQ PHY/QHP: CPT

## 2021-05-05 PROCEDURE — 82962 GLUCOSE BLOOD TEST: CPT

## 2021-05-05 NOTE — ED ADULT NURSE REASSESSMENT NOTE - NS ED NURSE REASSESS COMMENT FT1
Pt. left without being seen, states "I don't want to be here." Ambulated out of ED with steady gait.

## 2021-05-05 NOTE — ED ADULT TRIAGE NOTE - CHIEF COMPLAINT QUOTE
Pt. ambulatory into Ed. Pt. states she needs medical clearance and covid swab for [facility]. pt came in stating her sugar is low FS 50s at home at took glucose tabs. pt also states she is stressed but denies SI.

## 2021-12-01 PROCEDURE — G9005: CPT

## 2022-05-11 NOTE — ED ADULT NURSE NOTE - HOW OFTEN DO YOU HAVE A DRINK CONTAINING ALCOHOL?
Never Complex Repair And Split-Thickness Skin Graft Text: The defect edges were debeveled with a #15 scalpel blade.  The primary defect was closed partially with a complex linear closure.  Given the location of the defect, shape of the defect and the proximity to free margins a split thickness skin graft was deemed most appropriate to repair the remaining defect.  The graft was trimmed to fit the size of the remaining defect.  The graft was then placed in the primary defect, oriented appropriately, and sutured into place.

## 2022-05-31 NOTE — ED ADULT NURSE NOTE - NSSISCREENINGQ1_ED_A_ED
Samples Given: Neutrogena micronized benzoyl peroxide wash Initiate Treatment: Clindamycin 1% lotion- apply pea size amount to face QAM. Detail Level: Zone Continue Regimen: Tretinoin 0.025% - apply pea size amount to face QHS Plan: Patient may use Triamcinolone 0.1% ointment on affected bug bites and can take OTC Benadryl for bee stings. Patient was given written instructions. No

## 2022-09-17 NOTE — ED ADULT NURSE REASSESSMENT NOTE - GASTROINTESTINAL WDL
Abdomen soft, nontender, nondistended, bowel sounds present in all 4 quadrants. 3 y/o M with no PMH presenting for cough and difficulty breathing. Patient was in his usual state of health yesterday - no URI sx. Overnight, patient suddenly woke up coughing and was having difficulty breathing. Mom gave albuterol nebulizer at home without improvement so called 911. EMS gave IM decadron and rac epi x1 with improvement. No recent fever, URI sx. No known sick contacts, but goes to . Vaccines UTD.

## 2022-11-07 NOTE — ED ADULT NURSE NOTE - DISCHARGE DATE/TIME
Quality 226: Preventive Care And Screening: Tobacco Use: Screening And Cessation Intervention: Patient screened for tobacco use and is an ex/non-smoker
Quality 130: Documentation Of Current Medications In The Medical Record: Current Medications Documented
Detail Level: Detailed
11-Aug-2017 05:08
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
## 2025-05-05 NOTE — ED ADULT NURSE NOTE - NS ED NOTE ABUSE RESPONSE YN
Surgery Instructions          [facility]  [address]  [place] [address]  T: [phone]  F: [phone]    Surgery: Coronary Angiogram with Possible Percutaneous Transluminal Coronary Angioplasty and Stenting     Date of Surgery:  Thursday, May 22nd, 2025. Please ARRIVE at 7:00am, PROCEDURE will be at 9:00am.    You will need a ride home. There is a possibility of an extended period of bedrest and/or a possibility of an overnight stay.    The Surgery Department at [facility] in [place] is located on the 1st floor between our two Main Entrances (North and South) on [address]. Please use the Main Hospital entrance.     Preoperative Instructions:  Any required lab work must be completed within 2-3 days of your procedure.     Medication Instructions:  Do not take the following medications the day before or the day of your procedure:    Diabetic Medication:  Do not take the following medication two days before your procedure, the day of your procedure, or two days after the procedure:  Metformin (HOLD FOR 48 HOURS)      Fasting Instructions:  Fasting - DO NOT eat or drink (including water and medications) after MIDNIGHT on the day prior to your procedure.              Important Information:  The Business Office/Managed Care Department will seek authorization from your insurance prior to your procedure (this does not include Workmen’s Compensation or  Involved Accidents). You would receive notification of any issues.    If your employer or family member requires completion of forms (ex. FMLA paperwork), bring these into the [facility]. First complete your portion and sign a release so we may fax these forms for you, this is a HIPAA requirement. They can take up to 7-10 business days to complete.  If you should have any questions, please call the Cardiology Clinic at [phone].     Yes

## 2025-06-03 NOTE — ED ADULT TRIAGE NOTE - NS ED NURSE BANDS TYPE
Hospitalist Admission Note    NAME:   Francisca Norris   : 1940   MRN: [id number]     Date/Time: 2025 11:22 PM    Patient PCP: Rebekah Puri APRN - NP    ______________________________________________________________________  Given the patient's current clinical presentation, I have a high level of concern for decompensation if discharged from the emergency department.  Complex decision making was performed, which includes reviewing the patient's available past medical records, laboratory results, and x-ray films.       My assessment of this patient's clinical condition and my plan of care is as follows.    Assessment / Plan:    Acute hypoxic respiratory failure  R hilar mass with compression of R pulmonary artery  -Admit to remote tele   -telemetry monitoring, continuous pulse ox  -Consult oncology, pulmonology - appreciate expertise  - , does not appear overloaded    CTA chest: 1. No CT evidence for pulmonary embolus at this time. Extrinsic compression of the right pulmonary arterial system and the right pulmonary veins by a right hilar mass.  2. Right hilar mass or clustered lymph nodes.  3. Mediastinal lymphadenopathy.  4. Incidental upper abdominal findings previously described.    Cirrhosis, transaminitis  Liver lesions concerning for metastatic disease  -appreciate oncology assistance  -IR consulted for biopsy    Ultrasound abdomen:Cirrhotic change with scattered hypodensities consistent with metastatic disease.Imaging findings are concerning for neoplastic process/metastatic disease. Otherwise unremarkable examination.    CT abdomen pelvis:Numerous hypodense lesions in the liver, concerning for metastatic disease. 17 mm nonspecific left adrenal nodule. Posterior bladder wall thickening. Suggest cystoscopic evaluation to evaluate for mass.  No nephrolithiasis or hydronephrosis.    Acute kidney injury  -Creatinine 1.7, unclear baseline-most recent creatinine was in  and  Name band; lymphadenopathy. 4. Incidental upper abdominal findings previously described. Electronically signed by ESTHER MARCUM     ABDOMEN LIMITED Specify organ? GALLBLADDER  Result Date: 6/2/2025  Clinical history: elevated liver enzymes INDICATION:   elevated liver enzymes COMPARISON: None FINDINGS: Right upper quadrant ultrasonographic images of the abdomen were obtained using a curved array transducer. GALLBLADDER: No cholelithiasis. No pericholecystic edema or fluid. COMMON DUCT: 7 millimeters in diameter. No visualized calculi.. LIVER: Enlarged, increased, heterogenous in echogenicity. No duct dilatation. No mass lesion. MAIN PORTAL VEIN: Patent. Appropriate hepatopetal flow. PANCREAS: Incompletely visualized secondary to overlying bowel gas. No definite mass or ductal dilatation is evident. RIGHT KIDNEY: Normal in size. No hydronephrosis, shadowing calculus, or contour-deforming renal mass.     Cirrhotic change with scattered hypodensities consistent with metastatic disease. Imaging findings are concerning for neoplastic process/metastatic disease. Otherwise unremarkable examination. Electronically signed by JENNIFER MEANS    CT ABDOMEN PELVIS W IV CONTRAST Additional Contrast? None  Result Date: 6/2/2025  EXAM: CT ABDOMEN PELVIS W IV CONTRAST INDICATION: R flank pain COMPARISON: None CONTRAST: 100 mL of Isovue-370. ORAL CONTRAST: None TECHNIQUE: Following intravenous administration of contrast, thin axial images were obtained through the abdomen and pelvis. Coronal and sagittal reconstructions were generated. CT dose reduction was achieved through use of a standardized protocol tailored for this examination and automatic exposure control for dose modulation. FINDINGS: LOWER THORAX: No significant abnormality in the incidentally imaged lower chest. LIVER: Numerous hypodense lesions throughout the liver, measuring up to 2.6 cm. Enlarged gastrohepatic/periportal lymph nodes measuring up to 17 mm. BILIARY TREE: